# Patient Record
Sex: MALE | Race: BLACK OR AFRICAN AMERICAN | Employment: OTHER | URBAN - METROPOLITAN AREA
[De-identification: names, ages, dates, MRNs, and addresses within clinical notes are randomized per-mention and may not be internally consistent; named-entity substitution may affect disease eponyms.]

---

## 2017-03-20 ENCOUNTER — TELEPHONE (OUTPATIENT)
Dept: PHARMACY | Facility: CLINIC | Age: 47
End: 2017-03-20

## 2017-10-03 ENCOUNTER — CARE COORDINATION (OUTPATIENT)
Dept: CARE COORDINATION | Age: 47
End: 2017-10-03

## 2018-04-18 ENCOUNTER — CARE COORDINATION (OUTPATIENT)
Dept: CARE COORDINATION | Age: 48
End: 2018-04-18

## 2018-04-19 RX ORDER — ATORVASTATIN CALCIUM 10 MG/1
10 TABLET, FILM COATED ORAL DAILY
Qty: 30 TABLET | Refills: 3 | Status: SHIPPED | OUTPATIENT
Start: 2018-04-19 | End: 2018-07-09

## 2018-04-19 RX ORDER — FUROSEMIDE 40 MG/1
TABLET ORAL
Qty: 90 TABLET | Refills: 3 | Status: SHIPPED | OUTPATIENT
Start: 2018-04-19 | End: 2018-08-21 | Stop reason: SDUPTHER

## 2018-05-29 ENCOUNTER — OFFICE VISIT (OUTPATIENT)
Dept: INTERNAL MEDICINE CLINIC | Age: 48
End: 2018-05-29
Payer: MEDICARE

## 2018-05-29 VITALS
HEIGHT: 76 IN | BODY MASS INDEX: 38.36 KG/M2 | HEART RATE: 68 BPM | TEMPERATURE: 98 F | WEIGHT: 315 LBS | RESPIRATION RATE: 16 BRPM | DIASTOLIC BLOOD PRESSURE: 85 MMHG | SYSTOLIC BLOOD PRESSURE: 126 MMHG

## 2018-05-29 DIAGNOSIS — I10 ESSENTIAL HYPERTENSION: ICD-10-CM

## 2018-05-29 DIAGNOSIS — R53.82 CHRONIC FATIGUE: ICD-10-CM

## 2018-05-29 DIAGNOSIS — E78.5 HYPERLIPIDEMIA WITH TARGET LDL LESS THAN 100: ICD-10-CM

## 2018-05-29 DIAGNOSIS — N18.4 CKD (CHRONIC KIDNEY DISEASE) STAGE 4, GFR 15-29 ML/MIN (HCC): Primary | Chronic | ICD-10-CM

## 2018-05-29 PROCEDURE — 99212 OFFICE O/P EST SF 10 MIN: CPT | Performed by: INTERNAL MEDICINE

## 2018-05-29 PROCEDURE — G8417 CALC BMI ABV UP PARAM F/U: HCPCS | Performed by: INTERNAL MEDICINE

## 2018-05-29 PROCEDURE — G8427 DOCREV CUR MEDS BY ELIG CLIN: HCPCS | Performed by: INTERNAL MEDICINE

## 2018-05-29 PROCEDURE — 99213 OFFICE O/P EST LOW 20 MIN: CPT | Performed by: INTERNAL MEDICINE

## 2018-05-29 PROCEDURE — 1036F TOBACCO NON-USER: CPT | Performed by: INTERNAL MEDICINE

## 2018-06-13 RX ORDER — METOPROLOL SUCCINATE 100 MG/1
100 TABLET, EXTENDED RELEASE ORAL 2 TIMES DAILY
Qty: 60 TABLET | Refills: 5 | Status: SHIPPED | OUTPATIENT
Start: 2018-06-13 | End: 2018-07-09

## 2018-06-13 RX ORDER — CLONIDINE HYDROCHLORIDE 0.2 MG/1
0.2 TABLET ORAL 3 TIMES DAILY
Qty: 90 TABLET | Refills: 3 | Status: SHIPPED | OUTPATIENT
Start: 2018-06-13 | End: 2018-10-20 | Stop reason: SDUPTHER

## 2018-06-13 RX ORDER — ALLOPURINOL 100 MG/1
100 TABLET ORAL NIGHTLY
Qty: 30 TABLET | Refills: 5 | Status: SHIPPED | OUTPATIENT
Start: 2018-06-13 | End: 2018-12-17 | Stop reason: SDUPTHER

## 2018-06-28 ENCOUNTER — TELEPHONE (OUTPATIENT)
Dept: INTERNAL MEDICINE CLINIC | Age: 48
End: 2018-06-28

## 2018-07-09 ENCOUNTER — HOSPITAL ENCOUNTER (EMERGENCY)
Age: 48
Discharge: HOME | End: 2018-07-09
Payer: MEDICARE

## 2018-07-09 VITALS
HEART RATE: 65 BPM | DIASTOLIC BLOOD PRESSURE: 64 MMHG | OXYGEN SATURATION: 95 % | TEMPERATURE: 99.5 F | RESPIRATION RATE: 18 BRPM | SYSTOLIC BLOOD PRESSURE: 116 MMHG

## 2018-07-09 DIAGNOSIS — Z76.0 ENCOUNTER FOR MEDICATION REFILL: Primary | ICD-10-CM

## 2018-07-09 PROCEDURE — 99281 EMR DPT VST MAYX REQ PHY/QHP: CPT

## 2018-07-09 RX ORDER — METOPROLOL SUCCINATE 100 MG/1
100 TABLET, EXTENDED RELEASE ORAL DAILY
Qty: 30 TABLET | Refills: 0 | Status: ON HOLD | OUTPATIENT
Start: 2018-07-09 | End: 2018-11-13 | Stop reason: HOSPADM

## 2018-07-09 RX ORDER — PRAVASTATIN SODIUM 40 MG
40 TABLET ORAL DAILY
Qty: 30 TABLET | Refills: 0 | Status: SHIPPED | OUTPATIENT
Start: 2018-07-09 | End: 2019-02-12

## 2018-07-09 NOTE — ED PROVIDER NOTES
[facility]     Department of Emergency Medicine   ED  Provider Note  Admit Date/RoomTime: 7/9/2018  2:01 PM  ED Room: DEEPA/DEEPA  Chief Complaint   Medication Refill    History of Present Illness   Source of history provided by:  patient and spouse/SO. History/Exam Limitations: none. Slava Eddy is a 51 y.o. 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 Patient states that he was on disability and did not receive his disability check on the 3rd of this month when he normally does. He states that he was alerted at that time that he was taken off of his disability and therefore lost his Medicaid. Patient initially advises me that he is out of all of his medication for the past week. Upon further questioning and collaboration with his pharmacy, he has had most of his medications. Patient then admits that he was able to  some of them and has refills as well as prescription, he is just unable to afford them. Upon further review, patient could not afford his Lipitor, which she will be out of in 4 days' time. He will also be out of his metoprolol in a few days and he was unable to afford that as well. He has all his other medications and has been taking them as directed. He denies any chest pain, shortness of breath or leg swelling.  No other complaints. ROS   Pertinent positives and negatives are stated within HPI, all other systems reviewed and are negative. Past Surgical History:  has a past surgical history that includes Tonsillectomy and adenoidectomy; Carpal tunnel release; ECHO Compl W Dop Color Flow (4/21/2012 EF 60%); ECHO Compl W Dop Color Flow (9/7/2012); Cardiac pacemaker placement (4/27/12); AV fistula repair (Right, 6/28/13); Dialysis fistula creation (Right, 02/28/2104); and pacemaker placement. Social History:  reports that he has never smoked. He has never used smokeless tobacco. He reports that he does not drink alcohol or use drugs. Family History: family history includes Diabetes in his mother; Kidney Disease in his mother. Allergies: Patient has no known allergies. Physical Exam            ED Triage Vitals [07/09/18 1358]   BP Temp Temp Source Pulse Resp SpO2 Height Weight   116/64 99.5 °F (37.5 °C) Oral 65 18 95 % -- --      Oxygen Saturation Interpretation: Normal.    Constitutional:  Alert, development consistent with age. HEENT:  NC/NT. Airway patent. Neck:  Normal ROM. Supple. Respiratory:  Clear to auscultation and breath sounds equal.    CV: Regular rate and rhythm, normal heart sounds, without pathological murmurs, ectopy, gallops, or rubs. GI:  Abdomen Soft, nontender, good bowel sounds. No firm or pulsatile mass. Integument:  No rashes, erythema present. Lymphatics: No lymphangitis or adenopathy noted. Neurological:  Oriented. Motor functions intact. Lab / Imaging Results   (All laboratory and radiology results have been personally reviewed by myself)  Labs:  No results found for this visit on 07/09/18. Imaging: All Radiology results interpreted by Radiologist unless otherwise noted.   No orders to display       ED Course / Medical Decision Making   Medications - No data to display     Consults:   None    , The emergency room pharmacist spoke with his pharmacy and we were able to establish that

## 2018-07-16 ENCOUNTER — CARE COORDINATION (OUTPATIENT)
Dept: CARE COORDINATION | Age: 48
End: 2018-07-16

## 2018-07-17 NOTE — CARE COORDINATION
LATE ENTRY:  7/16/2018 @ 1:37    Patient called for 7/9/2018 ED visit f/u  - Message left on patients phone asking how he is doing, if he got his discharge instructions, if he has made his ED f/u appointment, if he has any needs or questions and asked that he call back at their earliest convenience with contact information given.

## 2018-07-31 ENCOUNTER — CARE COORDINATION (OUTPATIENT)
Dept: CARE COORDINATION | Age: 48
End: 2018-07-31

## 2018-08-15 ENCOUNTER — HOSPITAL ENCOUNTER (OUTPATIENT)
Dept: CT IMAGING | Age: 48
Discharge: HOME | End: 2018-08-15
Payer: MEDICARE

## 2018-08-15 ENCOUNTER — HOSPITAL ENCOUNTER (OUTPATIENT)
Age: 48
Discharge: HOME | End: 2018-08-15
Payer: MEDICARE

## 2018-08-15 DIAGNOSIS — R22.1 NECK MASS: ICD-10-CM

## 2018-08-15 LAB
T4 FREE: 1.14 NG/DL (ref 0.93–1.7)
T4 TOTAL: 6.4 MCG/DL (ref 4.5–11.7)
TSH SERPL DL<=0.05 MIU/L-ACNC: 3.32 UIU/ML (ref 0.27–4.2)

## 2018-08-15 PROCEDURE — 70490 CT SOFT TISSUE NECK W/O DYE: CPT

## 2018-08-15 PROCEDURE — 84439 ASSAY OF FREE THYROXINE: CPT

## 2018-08-15 PROCEDURE — 36415 COLL VENOUS BLD VENIPUNCTURE: CPT

## 2018-08-15 PROCEDURE — 84443 ASSAY THYROID STIM HORMONE: CPT

## 2018-08-21 RX ORDER — FUROSEMIDE 40 MG/1
TABLET ORAL
Qty: 90 TABLET | Refills: 3 | Status: SHIPPED | OUTPATIENT
Start: 2018-08-21 | End: 2019-02-04 | Stop reason: SDUPTHER

## 2018-09-07 ENCOUNTER — CARE COORDINATION (OUTPATIENT)
Dept: CARE COORDINATION | Age: 48
End: 2018-09-07

## 2018-09-19 ENCOUNTER — HOSPITAL ENCOUNTER (OUTPATIENT)
Age: 48
Discharge: HOME | End: 2018-09-19
Payer: MEDICARE

## 2018-09-19 LAB
ALBUMIN SERPL-MCNC: 3.7 G/DL (ref 3.5–5.2)
ALP BLD-CCNC: 72 U/L (ref 40–129)
ALT SERPL-CCNC: 6 U/L (ref 0–40)
ANION GAP SERPL CALCULATED.3IONS-SCNC: 13 MMOL/L (ref 7–16)
AST SERPL-CCNC: 14 U/L (ref 0–39)
BACTERIA: NORMAL /HPF
BASOPHILS ABSOLUTE: 0.05 E9/L (ref 0–0.2)
BASOPHILS RELATIVE PERCENT: 0.9 % (ref 0–2)
BILIRUB SERPL-MCNC: 3 MG/DL (ref 0–1.2)
BUN BLDV-MCNC: 36 MG/DL (ref 6–20)
CALCIUM SERPL-MCNC: 9.3 MG/DL (ref 8.6–10.2)
CHLORIDE BLD-SCNC: 101 MMOL/L (ref 98–107)
CO2: 30 MMOL/L (ref 22–29)
CREAT SERPL-MCNC: 3.5 MG/DL (ref 0.7–1.2)
CREATININE URINE: 93 MG/DL (ref 40–278)
EOSINOPHILS ABSOLUTE: 0.16 E9/L (ref 0.05–0.5)
EOSINOPHILS RELATIVE PERCENT: 2.8 % (ref 0–6)
GFR AFRICAN AMERICAN: 23
GFR NON-AFRICAN AMERICAN: 19 ML/MIN/1.73
GLUCOSE BLD-MCNC: 128 MG/DL (ref 74–109)
HCT VFR BLD CALC: 38.4 % (ref 37–54)
HEMOGLOBIN: 11.3 G/DL (ref 12.5–16.5)
IMMATURE GRANULOCYTES #: 0.02 E9/L
IMMATURE GRANULOCYTES %: 0.3 % (ref 0–5)
LYMPHOCYTES ABSOLUTE: 1.51 E9/L (ref 1.5–4)
LYMPHOCYTES RELATIVE PERCENT: 26.4 % (ref 20–42)
MCH RBC QN AUTO: 28.3 PG (ref 26–35)
MCHC RBC AUTO-ENTMCNC: 29.4 % (ref 32–34.5)
MCV RBC AUTO: 96.2 FL (ref 80–99.9)
MONOCYTES ABSOLUTE: 0.54 E9/L (ref 0.1–0.95)
MONOCYTES RELATIVE PERCENT: 9.4 % (ref 2–12)
NEUTROPHILS ABSOLUTE: 3.45 E9/L (ref 1.8–7.3)
NEUTROPHILS RELATIVE PERCENT: 60.2 % (ref 43–80)
PARATHYROID HORMONE INTACT: 194 PG/ML (ref 15–65)
PDW BLD-RTO: 16.8 FL (ref 11.5–15)
PHOSPHORUS: 3.5 MG/DL (ref 2.5–4.5)
PLATELET # BLD: 278 E9/L (ref 130–450)
PMV BLD AUTO: 10 FL (ref 7–12)
POTASSIUM SERPL-SCNC: 4.2 MMOL/L (ref 3.5–5)
PROTEIN PROTEIN: 294 MG/DL (ref 0–12)
PROTEIN/CREAT RATIO: 3.2
PROTEIN/CREAT RATIO: 3.2 (ref 0–0.2)
RBC # BLD: 3.99 E12/L (ref 3.8–5.8)
RBC UA: NORMAL /HPF (ref 0–2)
SODIUM BLD-SCNC: 144 MMOL/L (ref 132–146)
TOTAL PROTEIN: 7.9 G/DL (ref 6.4–8.3)
VITAMIN D 25-HYDROXY: 8 NG/ML (ref 30–100)
WBC # BLD: 5.7 E9/L (ref 4.5–11.5)
WBC UA: NORMAL /HPF (ref 0–5)

## 2018-09-19 PROCEDURE — 82306 VITAMIN D 25 HYDROXY: CPT

## 2018-09-19 PROCEDURE — 84156 ASSAY OF PROTEIN URINE: CPT

## 2018-09-19 PROCEDURE — 83970 ASSAY OF PARATHORMONE: CPT

## 2018-09-19 PROCEDURE — 36415 COLL VENOUS BLD VENIPUNCTURE: CPT

## 2018-09-19 PROCEDURE — 80053 COMPREHEN METABOLIC PANEL: CPT

## 2018-09-19 PROCEDURE — 84100 ASSAY OF PHOSPHORUS: CPT

## 2018-09-19 PROCEDURE — 85025 COMPLETE CBC W/AUTO DIFF WBC: CPT

## 2018-09-19 PROCEDURE — 81015 MICROSCOPIC EXAM OF URINE: CPT

## 2018-09-19 PROCEDURE — 82044 UR ALBUMIN SEMIQUANTITATIVE: CPT

## 2018-09-19 PROCEDURE — 82570 ASSAY OF URINE CREATININE: CPT

## 2018-09-23 LAB
MICROALBUMIN UR-MCNC: 2056.7 MG/L
MICROALBUMIN/CREAT UR-RTO: 2211.5 (ref 0–30)

## 2018-10-22 RX ORDER — CLONIDINE HYDROCHLORIDE 0.2 MG/1
TABLET ORAL
Qty: 90 TABLET | Refills: 3 | Status: SHIPPED | OUTPATIENT
Start: 2018-10-22 | End: 2018-12-19 | Stop reason: SDUPTHER

## 2018-11-02 ENCOUNTER — CARE COORDINATION (OUTPATIENT)
Dept: CARE COORDINATION | Age: 48
End: 2018-11-02

## 2018-11-02 NOTE — CARE COORDINATION
Patient called for HTN, CHF and need for appointment  - message left on both numbers asking how patient is doing and that we have missed him at the office for f/u  - asked that he call the office to make an appointment to call [facility] for follow up. Contact information given.

## 2018-11-10 ENCOUNTER — HOSPITAL ENCOUNTER (OUTPATIENT)
Dept: ULTRASOUND IMAGING | Age: 48
Discharge: HOME | DRG: 299 | End: 2018-11-10
Payer: MEDICARE

## 2018-11-10 ENCOUNTER — APPOINTMENT (OUTPATIENT)
Dept: ULTRASOUND IMAGING | Age: 48
DRG: 299 | End: 2018-11-10
Payer: MEDICARE

## 2018-11-10 ENCOUNTER — APPOINTMENT (OUTPATIENT)
Dept: GENERAL RADIOLOGY | Age: 48
DRG: 299 | End: 2018-11-10
Payer: MEDICARE

## 2018-11-10 ENCOUNTER — HOSPITAL ENCOUNTER (INPATIENT)
Age: 48
LOS: 3 days | Discharge: ANOTHER HOSPITAL | DRG: 299 | End: 2018-11-13
Attending: EMERGENCY MEDICINE | Admitting: INTERNAL MEDICINE
Payer: MEDICARE

## 2018-11-10 DIAGNOSIS — R22.1 NECK MASS: ICD-10-CM

## 2018-11-10 DIAGNOSIS — R06.00 DYSPNEA AND RESPIRATORY ABNORMALITIES: ICD-10-CM

## 2018-11-10 DIAGNOSIS — N28.9 RENAL INSUFFICIENCY: ICD-10-CM

## 2018-11-10 DIAGNOSIS — R60.0 FACIAL EDEMA: Primary | ICD-10-CM

## 2018-11-10 DIAGNOSIS — R06.89 DYSPNEA AND RESPIRATORY ABNORMALITIES: ICD-10-CM

## 2018-11-10 PROBLEM — I50.33 ACUTE ON CHRONIC DIASTOLIC (CONGESTIVE) HEART FAILURE (HCC): Status: ACTIVE | Noted: 2018-11-10

## 2018-11-10 LAB
ALBUMIN SERPL-MCNC: 3.7 G/DL (ref 3.5–5.2)
ALP BLD-CCNC: 72 U/L (ref 40–129)
ALT SERPL-CCNC: 7 U/L (ref 0–40)
ANION GAP SERPL CALCULATED.3IONS-SCNC: 11 MMOL/L (ref 7–16)
AST SERPL-CCNC: 13 U/L (ref 0–39)
BACTERIA: NORMAL /HPF
BASOPHILS ABSOLUTE: 0.04 E9/L (ref 0–0.2)
BASOPHILS RELATIVE PERCENT: 0.8 % (ref 0–2)
BILIRUB SERPL-MCNC: 3 MG/DL (ref 0–1.2)
BILIRUBIN URINE: NEGATIVE
BLOOD, URINE: ABNORMAL
BUN BLDV-MCNC: 38 MG/DL (ref 6–20)
CALCIUM SERPL-MCNC: 9 MG/DL (ref 8.6–10.2)
CHLORIDE BLD-SCNC: 103 MMOL/L (ref 98–107)
CLARITY: CLEAR
CO2: 29 MMOL/L (ref 22–29)
COLOR: YELLOW
CREAT SERPL-MCNC: 4.1 MG/DL (ref 0.7–1.2)
EKG ATRIAL RATE: 267 BPM
EKG Q-T INTERVAL: 452 MS
EKG QRS DURATION: 126 MS
EKG QTC CALCULATION (BAZETT): 412 MS
EKG R AXIS: -37 DEGREES
EKG T AXIS: 169 DEGREES
EKG VENTRICULAR RATE: 50 BPM
EOSINOPHILS ABSOLUTE: 0.21 E9/L (ref 0.05–0.5)
EOSINOPHILS RELATIVE PERCENT: 4.3 % (ref 0–6)
EPITHELIAL CELLS, UA: NORMAL /HPF
GFR AFRICAN AMERICAN: 19
GFR NON-AFRICAN AMERICAN: 16 ML/MIN/1.73
GLUCOSE BLD-MCNC: 136 MG/DL (ref 74–99)
GLUCOSE URINE: NEGATIVE MG/DL
HCT VFR BLD CALC: 36.5 % (ref 37–54)
HEMOGLOBIN: 10.6 G/DL (ref 12.5–16.5)
IMMATURE GRANULOCYTES #: 0.01 E9/L
IMMATURE GRANULOCYTES %: 0.2 % (ref 0–5)
KETONES, URINE: NEGATIVE MG/DL
LEUKOCYTE ESTERASE, URINE: NEGATIVE
LV EF: 43 %
LVEF MODALITY: NORMAL
LYMPHOCYTES ABSOLUTE: 1.09 E9/L (ref 1.5–4)
LYMPHOCYTES RELATIVE PERCENT: 22.4 % (ref 20–42)
MCH RBC QN AUTO: 28.5 PG (ref 26–35)
MCHC RBC AUTO-ENTMCNC: 29 % (ref 32–34.5)
MCV RBC AUTO: 98.1 FL (ref 80–99.9)
MONOCYTES ABSOLUTE: 0.6 E9/L (ref 0.1–0.95)
MONOCYTES RELATIVE PERCENT: 12.3 % (ref 2–12)
NEUTROPHILS ABSOLUTE: 2.92 E9/L (ref 1.8–7.3)
NEUTROPHILS RELATIVE PERCENT: 60 % (ref 43–80)
NITRITE, URINE: NEGATIVE
PDW BLD-RTO: 18 FL (ref 11.5–15)
PH UA: 6 (ref 5–9)
PLATELET # BLD: 269 E9/L (ref 130–450)
PMV BLD AUTO: 10.5 FL (ref 7–12)
POTASSIUM SERPL-SCNC: 4.1 MMOL/L (ref 3.5–5)
PRO-BNP: ABNORMAL PG/ML (ref 0–125)
PROTEIN UA: 100 MG/DL
RBC # BLD: 3.72 E12/L (ref 3.8–5.8)
RBC UA: NORMAL /HPF (ref 0–2)
SODIUM BLD-SCNC: 143 MMOL/L (ref 132–146)
SPECIFIC GRAVITY UA: 1.02 (ref 1–1.03)
TOTAL PROTEIN: 7.6 G/DL (ref 6.4–8.3)
TROPONIN: 0.05 NG/ML (ref 0–0.03)
TROPONIN: 0.06 NG/ML (ref 0–0.03)
UROBILINOGEN, URINE: 4 E.U./DL
WBC # BLD: 4.9 E9/L (ref 4.5–11.5)
WBC UA: NORMAL /HPF (ref 0–5)

## 2018-11-10 PROCEDURE — 84484 ASSAY OF TROPONIN QUANT: CPT

## 2018-11-10 PROCEDURE — 93970 EXTREMITY STUDY: CPT

## 2018-11-10 PROCEDURE — 83880 ASSAY OF NATRIURETIC PEPTIDE: CPT

## 2018-11-10 PROCEDURE — 2580000003 HC RX 258: Performed by: INTERNAL MEDICINE

## 2018-11-10 PROCEDURE — 6360000002 HC RX W HCPCS: Performed by: INTERNAL MEDICINE

## 2018-11-10 PROCEDURE — 80053 COMPREHEN METABOLIC PANEL: CPT

## 2018-11-10 PROCEDURE — 94640 AIRWAY INHALATION TREATMENT: CPT

## 2018-11-10 PROCEDURE — 76536 US EXAM OF HEAD AND NECK: CPT

## 2018-11-10 PROCEDURE — 2060000000 HC ICU INTERMEDIATE R&B

## 2018-11-10 PROCEDURE — 81001 URINALYSIS AUTO W/SCOPE: CPT

## 2018-11-10 PROCEDURE — 85025 COMPLETE CBC W/AUTO DIFF WBC: CPT

## 2018-11-10 PROCEDURE — 93005 ELECTROCARDIOGRAM TRACING: CPT | Performed by: PHYSICIAN ASSISTANT

## 2018-11-10 PROCEDURE — 93306 TTE W/DOPPLER COMPLETE: CPT

## 2018-11-10 PROCEDURE — 6370000000 HC RX 637 (ALT 250 FOR IP): Performed by: INTERNAL MEDICINE

## 2018-11-10 PROCEDURE — 99285 EMERGENCY DEPT VISIT HI MDM: CPT

## 2018-11-10 PROCEDURE — 36415 COLL VENOUS BLD VENIPUNCTURE: CPT

## 2018-11-10 PROCEDURE — 71045 X-RAY EXAM CHEST 1 VIEW: CPT

## 2018-11-10 RX ORDER — SODIUM CHLORIDE 0.9 % (FLUSH) 0.9 %
10 SYRINGE (ML) INJECTION PRN
Status: DISCONTINUED | OUTPATIENT
Start: 2018-11-10 | End: 2018-11-13 | Stop reason: HOSPADM

## 2018-11-10 RX ORDER — IPRATROPIUM BROMIDE AND ALBUTEROL SULFATE 2.5; .5 MG/3ML; MG/3ML
1 SOLUTION RESPIRATORY (INHALATION)
Status: DISCONTINUED | OUTPATIENT
Start: 2018-11-10 | End: 2018-11-13 | Stop reason: HOSPADM

## 2018-11-10 RX ORDER — ONDANSETRON 2 MG/ML
4 INJECTION INTRAMUSCULAR; INTRAVENOUS EVERY 6 HOURS PRN
Status: DISCONTINUED | OUTPATIENT
Start: 2018-11-10 | End: 2018-11-13 | Stop reason: HOSPADM

## 2018-11-10 RX ORDER — PRAVASTATIN SODIUM 20 MG
40 TABLET ORAL DAILY
Status: DISCONTINUED | OUTPATIENT
Start: 2018-11-10 | End: 2018-11-13 | Stop reason: HOSPADM

## 2018-11-10 RX ORDER — ASCORBIC ACID 500 MG
500 TABLET ORAL DAILY
Status: DISCONTINUED | OUTPATIENT
Start: 2018-11-10 | End: 2018-11-13 | Stop reason: HOSPADM

## 2018-11-10 RX ORDER — LOSARTAN POTASSIUM 50 MG/1
50 TABLET ORAL DAILY
Status: DISCONTINUED | OUTPATIENT
Start: 2018-11-10 | End: 2018-11-13 | Stop reason: HOSPADM

## 2018-11-10 RX ORDER — METOPROLOL SUCCINATE 100 MG/1
100 TABLET, EXTENDED RELEASE ORAL 2 TIMES DAILY
Status: DISCONTINUED | OUTPATIENT
Start: 2018-11-10 | End: 2018-11-13 | Stop reason: HOSPADM

## 2018-11-10 RX ORDER — 0.9 % SODIUM CHLORIDE 0.9 %
250 INTRAVENOUS SOLUTION INTRAVENOUS ONCE
Status: DISCONTINUED | OUTPATIENT
Start: 2018-11-10 | End: 2018-11-12

## 2018-11-10 RX ORDER — SODIUM CHLORIDE 0.9 % (FLUSH) 0.9 %
10 SYRINGE (ML) INJECTION EVERY 12 HOURS SCHEDULED
Status: DISCONTINUED | OUTPATIENT
Start: 2018-11-10 | End: 2018-11-13 | Stop reason: HOSPADM

## 2018-11-10 RX ORDER — METOPROLOL SUCCINATE 100 MG/1
100 TABLET, EXTENDED RELEASE ORAL 2 TIMES DAILY
Status: DISCONTINUED | OUTPATIENT
Start: 2018-11-10 | End: 2018-11-10 | Stop reason: CLARIF

## 2018-11-10 RX ORDER — ALLOPURINOL 100 MG/1
100 TABLET ORAL NIGHTLY
Status: DISCONTINUED | OUTPATIENT
Start: 2018-11-10 | End: 2018-11-13 | Stop reason: HOSPADM

## 2018-11-10 RX ORDER — CLONIDINE HYDROCHLORIDE 0.2 MG/1
0.2 TABLET ORAL 3 TIMES DAILY
Status: DISCONTINUED | OUTPATIENT
Start: 2018-11-10 | End: 2018-11-13 | Stop reason: HOSPADM

## 2018-11-10 RX ORDER — ACETAMINOPHEN 325 MG/1
650 TABLET ORAL EVERY 4 HOURS PRN
Status: DISCONTINUED | OUTPATIENT
Start: 2018-11-10 | End: 2018-11-13 | Stop reason: HOSPADM

## 2018-11-10 RX ORDER — LANOLIN ALCOHOL/MO/W.PET/CERES
3 CREAM (GRAM) TOPICAL NIGHTLY PRN
Status: DISCONTINUED | OUTPATIENT
Start: 2018-11-10 | End: 2018-11-13 | Stop reason: HOSPADM

## 2018-11-10 RX ORDER — HEPARIN SODIUM 5000 [USP'U]/ML
5000 INJECTION, SOLUTION INTRAVENOUS; SUBCUTANEOUS EVERY 8 HOURS SCHEDULED
Status: DISCONTINUED | OUTPATIENT
Start: 2018-11-10 | End: 2018-11-13

## 2018-11-10 RX ORDER — ASPIRIN 81 MG/1
81 TABLET ORAL DAILY
Status: DISCONTINUED | OUTPATIENT
Start: 2018-11-10 | End: 2018-11-13 | Stop reason: HOSPADM

## 2018-11-10 RX ADMIN — IPRATROPIUM BROMIDE AND ALBUTEROL SULFATE 1 AMPULE: .5; 3 SOLUTION RESPIRATORY (INHALATION) at 19:49

## 2018-11-10 RX ADMIN — ALLOPURINOL 100 MG: 100 TABLET ORAL at 21:02

## 2018-11-10 RX ADMIN — Medication 10 ML: at 21:03

## 2018-11-10 RX ADMIN — CLONIDINE HYDROCHLORIDE 0.2 MG: 0.2 TABLET ORAL at 21:02

## 2018-11-10 RX ADMIN — FUROSEMIDE 40 MG: 10 INJECTION, SOLUTION INTRAMUSCULAR; INTRAVENOUS at 21:03

## 2018-11-10 RX ADMIN — CLONIDINE HYDROCHLORIDE 0.2 MG: 0.2 TABLET ORAL at 16:00

## 2018-11-10 RX ADMIN — FUROSEMIDE 40 MG: 10 INJECTION, SOLUTION INTRAMUSCULAR; INTRAVENOUS at 16:00

## 2018-11-10 RX ADMIN — METOPROLOL SUCCINATE 100 MG: 100 TABLET, EXTENDED RELEASE ORAL at 21:02

## 2018-11-10 ASSESSMENT — PAIN SCALES - GENERAL
PAINLEVEL_OUTOF10: 9
PAINLEVEL_OUTOF10: 5

## 2018-11-10 ASSESSMENT — PAIN DESCRIPTION - PAIN TYPE
TYPE: ACUTE PAIN
TYPE: ACUTE PAIN

## 2018-11-10 ASSESSMENT — PAIN DESCRIPTION - ORIENTATION
ORIENTATION: RIGHT
ORIENTATION: RIGHT

## 2018-11-10 ASSESSMENT — PAIN DESCRIPTION - LOCATION
LOCATION: FOOT
LOCATION: FOOT

## 2018-11-10 ASSESSMENT — PAIN DESCRIPTION - DESCRIPTORS
DESCRIPTORS: ACHING
DESCRIPTORS: ACHING

## 2018-11-10 NOTE — ED PROVIDER NOTES
ED Attending  CC: No      HPI:  11/10/18,   Time: 10:55 AM         Beth Anton is a 51 y.o. male presenting to the ED for facial edema and SOB, beginning 3 weeks ago. The complaint has been intermittent, moderate in severity, and worsened by nothing. Pt presents from [address] [place] [address] today. Wife states he has been climbing over the last few weeks. She reports intermittent swelling in his face and particularly around his eyes. He's been increasingly weak and short of breath. Wife states that he goes to [employer] but spends most of the rest of the week in bed. He has a known history of chronic kidney disease stage IV, diastolic congestive heart failure, atrial fibrillation, hypertension, and gout. The patient has a right AV fistula though it is never been used. In 2017 he was seen by Dr. 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 There was a concern for venous outflow tract obstruction. 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 He had balloon angioplasty of both areas. The patient notes that his current symptoms are very similar to this episode in 2017. He has been undergoing workup recently with ENT for some swollen lymph nodes in his neck. Pt denies CP or peripheral edema. States he is SOB with exertion and has heard himself wheezing intermittently. Denies fever or chills.           ROS:     Constitutional: Negative for fever and chills  HENT:See HPI  Eyes: See HPI  Respiratory: See HPI  Cardiovascular: See HPI  Gastrointestinal: Negative for nausea, vomiting, diarrhea and abdominal distention  Genitourinary: Negative for dysuria and frequency  Musculoskeletal: Negative for back pain and arthralgia  Skin: Negative for rash and wound  Neurological: Negative for weakness and headaches  Hematological: Negative for adenopathy    All other systems reviewed and are negative      -------------------------------- PAST HISTORY ----------------------------------  Past Medical History:  has a past medical history of Arthritis; Blood circulation, collateral; Cardiomegaly; CKD (chronic kidney disease) stage 4, GFR 15-29 ml/min ([facility] Depression; Diastolic CHF ([facility] History of echocardiogram; History of echocardiogram; Hyperlipidemia; Hypertension; Hypertension; Knee dislocation; 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 Past Surgical History:  has a past surgical history that includes Tonsillectomy and adenoidectomy; Carpal tunnel release; ECHO Compl W Dop Color Flow (4/21/2012 EF 60%); ECHO Compl W Dop Color Flow (9/7/2012); Cardiac pacemaker placement (4/27/12); AV fistula repair (Right, 6/28/13); Dialysis fistula creation (Right, 02/28/2104); and pacemaker placement. Social History:  reports that he has never smoked. He has never used smokeless tobacco. He reports that he does not drink alcohol or use drugs. Family History: family history includes Diabetes in his mother; Kidney Disease in his mother. The patients home medications have been reviewed. Allergies: Patient has no known allergies.     --------------------------------- RESULTS ------------------------------------------  All laboratory and radiology results have been personally reviewed by myself   LABS:  Results for orders placed or performed during the hospital encounter of 11/10/18   CBC Auto Differential   Result Value Ref Range    WBC 4.9 4.5 - 11.5 E9/L    RBC 3.72 (L) 3.80 - 5.80 E12/L    Hemoglobin 10.6 (L) 12.5 - 16.5 g/dL    Hematocrit 36.5 (L) 37.0 - 54.0 %    MCV 98.1 80.0 - 99.9 fL    MCH 28.5 26.0 - 35.0 pg    MCHC 29.0 (L) 32.0 - 34.5 %    RDW 18.0 (H) 11.5 - 15.0 fL    Platelets 891 862 - 926 E9/L    MPV 10.5 7.0 - 12.0 fL    Neutrophils % 60.0 43.0 - 80.0 %    Immature Granulocytes % 0.2 0.0 - 5.0 %    Lymphocytes % 22.4 20.0 - 42.0 %    Monocytes % 12.3 (H) 2.0 - 12.0 %    Eosinophils % 4.3 0.0 - 6.0 %

## 2018-11-10 NOTE — H&P
Right 02/28/2104    ECHO COMPL W DOP COLOR FLOW  4/21/2012 EF 60%         ECHO COMPL W DOP COLOR FLOW  9/7/2012         PACEMAKER PLACEMENT      TONSILLECTOMY AND ADENOIDECTOMY       Family History   Problem Relation Age of Onset    Diabetes Mother     Kidney Disease Mother      Social History     Social History    Marital status:      Spouse name: N/A    Number of children: N/A    Years of education: N/A     Occupational History    unemployed      Social History Main Topics    Smoking status: Never Smoker    Smokeless tobacco: Never Used    Alcohol use No    Drug use: No      Comment: quit July of 2016    Sexual activity: Yes     Partners: Female     Other Topics Concern    Not on file     Social History Narrative    No narrative on file     Prior to Admission medications    Medication Sig Start Date End Date Taking?  Authorizing Provider   cloNIDine (CATAPRES) 0.2 MG tablet TAKE 1 TABLET BY MOUTH THREE TIMES DAILY 10/22/18  Yes Alie Johnson DO   furosemide (LASIX) 40 MG tablet TAKE ONE TABLET BY MOUTH THREE TIMES DAILY 8/21/18  Yes Wandy March DO   metoprolol succinate (TOPROL XL) 100 MG extended release tablet Take 1 tablet by mouth daily  Patient taking differently: Take 100 mg by mouth 2 times daily  7/9/18 11/10/18 Yes JAXSON Powers   pravastatin (PRAVACHOL) 40 MG tablet Take 1 tablet by mouth daily 7/9/18 11/10/18 Yes JAXSON Powers   allopurinol (ZYLOPRIM) 100 MG tablet Take 1 tablet by mouth nightly 6/13/18  Yes Ama March DO   losartan (COZAAR) 50 MG tablet Take 50 mg by mouth daily   Yes Historical Provider, MD   melatonin 3 MG TABS tablet Take 1 tablet by mouth daily 12/19/17  Yes Victor M Pals, DO   Cholecalciferol (VITAMIN D3) 5000 UNITS CAPS Take 1 capsule by mouth daily 4/21/17  Yes Antiem T Alford, DO   Ascorbic Acid (VITAMIN C) 500 MG tablet Take 500 mg by mouth daily   Yes Historical Provider, MD   aspirin (ASPIRIN LOW DOSE) 81 MG EC tablet are grossly intact, the patient moves all extremities spontaneously, and no focal deficits are appreciated on exam    Recent vitals, labs, and imaging results have been reviewed and are available in the electronic medical record. Assessment and Plan  Patient is a 51 y.o. male who presented with facial swelling and shortness of breath. The active problem list is as follows:    Acute exacerbation of diastolic CHF  CKD, stage IV  Adenopathy   Left neck cystic lesion seen on ultrasound  Pacemaker in situ  Nonischemic cardiomyopathy  Paroxysmal atrial fibrillation, on 81mg aspirin  Hypertension  Hyperlipidemia  Morbid obesity secondary to increased calorie intake    Patient will be admitted to [address] [place] [address]. Cardiac enzymes will be trended, and echocardiogram has been ordered. IV diuretics will be given. Nephrology and cardiology have been consulted. ENT has also been consulted regarding neck ultrasound findings. Unfortunately, contrasted images to further evaluate vasculature are unable to be obtained at this time due to patient's renal function. Patient follows with Dr. Elmo oFster, who does not come to this hospital. Additional vascular consultation or transfer to another facility will be arranged if indicated after further workup. · DVT prophylaxis with heparin.   · The case and plan of care were discussed with Dr. Heidy Fish DO PGY2  11/10/2018  1:10 PM

## 2018-11-10 NOTE — LETTER
[id number]  [facility] [address]  Phone: [phone]             November 13, 2018    Patient: Shu Neal   YOB: 1970   Date of Visit: 11/10/2018       To Whom It May Concern:    Jan Almanza was seen and treated in our facility  beginning 11/10/2018 until 11/13/2018.  He was transferred to [facility] on the 13th      Sincerely,       Elisha Styles RN         Signature:__________________________________

## 2018-11-11 ENCOUNTER — APPOINTMENT (OUTPATIENT)
Dept: ULTRASOUND IMAGING | Age: 48
DRG: 299 | End: 2018-11-11
Payer: MEDICARE

## 2018-11-11 LAB
ALBUMIN SERPL-MCNC: 3.6 G/DL (ref 3.5–5.2)
ALP BLD-CCNC: 63 U/L (ref 40–129)
ALT SERPL-CCNC: 8 U/L (ref 0–40)
ANION GAP SERPL CALCULATED.3IONS-SCNC: 11 MMOL/L (ref 7–16)
AST SERPL-CCNC: 29 U/L (ref 0–39)
BILIRUB SERPL-MCNC: 3 MG/DL (ref 0–1.2)
BUN BLDV-MCNC: 38 MG/DL (ref 6–20)
CALCIUM SERPL-MCNC: 9 MG/DL (ref 8.6–10.2)
CHLORIDE BLD-SCNC: 102 MMOL/L (ref 98–107)
CHOLESTEROL, TOTAL: 116 MG/DL (ref 0–199)
CO2: 30 MMOL/L (ref 22–29)
CREAT SERPL-MCNC: 4.1 MG/DL (ref 0.7–1.2)
GFR AFRICAN AMERICAN: 19
GFR NON-AFRICAN AMERICAN: 16 ML/MIN/1.73
GLUCOSE BLD-MCNC: 112 MG/DL (ref 74–99)
HBA1C MFR BLD: 5.4 % (ref 4–5.6)
HCT VFR BLD CALC: 35.5 % (ref 37–54)
HDLC SERPL-MCNC: 20 MG/DL
HEMOGLOBIN: 10.3 G/DL (ref 12.5–16.5)
LDL CHOLESTEROL CALCULATED: 66 MG/DL (ref 0–99)
MAGNESIUM: 2.1 MG/DL (ref 1.6–2.6)
MCH RBC QN AUTO: 28.8 PG (ref 26–35)
MCHC RBC AUTO-ENTMCNC: 29 % (ref 32–34.5)
MCV RBC AUTO: 99.2 FL (ref 80–99.9)
PDW BLD-RTO: 18.2 FL (ref 11.5–15)
PHOSPHORUS: 4.1 MG/DL (ref 2.5–4.5)
PLATELET # BLD: 238 E9/L (ref 130–450)
PMV BLD AUTO: 9.4 FL (ref 7–12)
POTASSIUM SERPL-SCNC: 5 MMOL/L (ref 3.5–5)
RBC # BLD: 3.58 E12/L (ref 3.8–5.8)
SODIUM BLD-SCNC: 143 MMOL/L (ref 132–146)
T4 FREE: 1.2 NG/DL (ref 0.93–1.7)
TOTAL PROTEIN: 7.6 G/DL (ref 6.4–8.3)
TRIGL SERPL-MCNC: 151 MG/DL (ref 0–149)
TROPONIN: 0.05 NG/ML (ref 0–0.03)
TSH SERPL DL<=0.05 MIU/L-ACNC: 3.04 UIU/ML (ref 0.27–4.2)
VLDLC SERPL CALC-MCNC: 30 MG/DL
WBC # BLD: 4.8 E9/L (ref 4.5–11.5)

## 2018-11-11 PROCEDURE — 84443 ASSAY THYROID STIM HORMONE: CPT

## 2018-11-11 PROCEDURE — 84439 ASSAY OF FREE THYROXINE: CPT

## 2018-11-11 PROCEDURE — 83036 HEMOGLOBIN GLYCOSYLATED A1C: CPT

## 2018-11-11 PROCEDURE — 2580000003 HC RX 258: Performed by: INTERNAL MEDICINE

## 2018-11-11 PROCEDURE — 6370000000 HC RX 637 (ALT 250 FOR IP): Performed by: INTERNAL MEDICINE

## 2018-11-11 PROCEDURE — 84100 ASSAY OF PHOSPHORUS: CPT

## 2018-11-11 PROCEDURE — 80061 LIPID PANEL: CPT

## 2018-11-11 PROCEDURE — 93971 EXTREMITY STUDY: CPT

## 2018-11-11 PROCEDURE — 36415 COLL VENOUS BLD VENIPUNCTURE: CPT

## 2018-11-11 PROCEDURE — 94640 AIRWAY INHALATION TREATMENT: CPT

## 2018-11-11 PROCEDURE — 83735 ASSAY OF MAGNESIUM: CPT

## 2018-11-11 PROCEDURE — 80053 COMPREHEN METABOLIC PANEL: CPT

## 2018-11-11 PROCEDURE — 84484 ASSAY OF TROPONIN QUANT: CPT

## 2018-11-11 PROCEDURE — 2060000000 HC ICU INTERMEDIATE R&B

## 2018-11-11 PROCEDURE — 6360000002 HC RX W HCPCS: Performed by: INTERNAL MEDICINE

## 2018-11-11 PROCEDURE — 85027 COMPLETE CBC AUTOMATED: CPT

## 2018-11-11 RX ORDER — METOLAZONE 2.5 MG/1
5 TABLET ORAL
Status: DISCONTINUED | OUTPATIENT
Start: 2018-11-12 | End: 2018-11-13 | Stop reason: HOSPADM

## 2018-11-11 RX ADMIN — METOPROLOL SUCCINATE 100 MG: 100 TABLET, EXTENDED RELEASE ORAL at 21:26

## 2018-11-11 RX ADMIN — LOSARTAN POTASSIUM 50 MG: 50 TABLET ORAL at 08:30

## 2018-11-11 RX ADMIN — ASPIRIN 81 MG: 81 TABLET ORAL at 08:30

## 2018-11-11 RX ADMIN — ALLOPURINOL 100 MG: 100 TABLET ORAL at 21:26

## 2018-11-11 RX ADMIN — CLONIDINE HYDROCHLORIDE 0.2 MG: 0.2 TABLET ORAL at 15:03

## 2018-11-11 RX ADMIN — IPRATROPIUM BROMIDE AND ALBUTEROL SULFATE 1 AMPULE: .5; 3 SOLUTION RESPIRATORY (INHALATION) at 13:07

## 2018-11-11 RX ADMIN — IPRATROPIUM BROMIDE AND ALBUTEROL SULFATE 1 AMPULE: .5; 3 SOLUTION RESPIRATORY (INHALATION) at 06:01

## 2018-11-11 RX ADMIN — IPRATROPIUM BROMIDE AND ALBUTEROL SULFATE 1 AMPULE: .5; 3 SOLUTION RESPIRATORY (INHALATION) at 09:41

## 2018-11-11 RX ADMIN — PRAVASTATIN SODIUM 40 MG: 20 TABLET ORAL at 08:30

## 2018-11-11 RX ADMIN — FUROSEMIDE 40 MG: 10 INJECTION, SOLUTION INTRAMUSCULAR; INTRAVENOUS at 08:30

## 2018-11-11 RX ADMIN — METOPROLOL SUCCINATE 100 MG: 100 TABLET, EXTENDED RELEASE ORAL at 08:30

## 2018-11-11 RX ADMIN — OXYCODONE HYDROCHLORIDE AND ACETAMINOPHEN 500 MG: 500 TABLET ORAL at 08:29

## 2018-11-11 RX ADMIN — IPRATROPIUM BROMIDE AND ALBUTEROL SULFATE 1 AMPULE: .5; 3 SOLUTION RESPIRATORY (INHALATION) at 16:17

## 2018-11-11 RX ADMIN — FUROSEMIDE 40 MG: 10 INJECTION, SOLUTION INTRAMUSCULAR; INTRAVENOUS at 21:26

## 2018-11-11 RX ADMIN — CLONIDINE HYDROCHLORIDE 0.2 MG: 0.2 TABLET ORAL at 08:30

## 2018-11-11 RX ADMIN — Medication 10 ML: at 21:26

## 2018-11-11 RX ADMIN — CHOLECALCIFEROL TAB 125 MCG (5000 UNIT) 5000 UNITS: 125 TAB at 08:30

## 2018-11-11 RX ADMIN — Medication 10 ML: at 08:31

## 2018-11-11 RX ADMIN — CLONIDINE HYDROCHLORIDE 0.2 MG: 0.2 TABLET ORAL at 21:26

## 2018-11-11 ASSESSMENT — PAIN DESCRIPTION - ORIENTATION
ORIENTATION: RIGHT
ORIENTATION: RIGHT

## 2018-11-11 ASSESSMENT — PAIN DESCRIPTION - PAIN TYPE
TYPE: ACUTE PAIN
TYPE: ACUTE PAIN

## 2018-11-11 ASSESSMENT — PAIN DESCRIPTION - DESCRIPTORS
DESCRIPTORS: ACHING;DISCOMFORT
DESCRIPTORS: ACHING;DISCOMFORT

## 2018-11-11 ASSESSMENT — PAIN DESCRIPTION - PROGRESSION: CLINICAL_PROGRESSION: NOT CHANGED

## 2018-11-11 ASSESSMENT — PAIN SCALES - GENERAL
PAINLEVEL_OUTOF10: 4
PAINLEVEL_OUTOF10: 0
PAINLEVEL_OUTOF10: 0
PAINLEVEL_OUTOF10: 3

## 2018-11-11 ASSESSMENT — PAIN DESCRIPTION - FREQUENCY: FREQUENCY: INTERMITTENT

## 2018-11-11 ASSESSMENT — PAIN DESCRIPTION - ONSET
ONSET: ON-GOING
ONSET: ON-GOING

## 2018-11-11 ASSESSMENT — PAIN DESCRIPTION - LOCATION
LOCATION: FOOT
LOCATION: FOOT

## 2018-11-11 NOTE — CONSULTS
Moves all 4 families. Proximal right upper extremity has a arteriovenous fistula with good thrill and bruit. No edema at the site. Could not appreciate mass or lesion in the right side of his neck.       Data:   Labs:  CBC with Differential:  Lab Results   Component Value Date    WBC 4.8 11/11/2018    RBC 3.58 11/11/2018    HGB 10.3 11/11/2018    HCT 35.5 11/11/2018     11/11/2018    MCV 99.2 11/11/2018    MCH 28.8 11/11/2018    MCHC 29.0 11/11/2018    RDW 18.2 11/11/2018    SEGSPCT 61 01/04/2013    LYMPHOPCT 22.4 11/10/2018    MONOPCT 12.3 11/10/2018    MYELOPCT 0.5 03/16/2017    EOSPCT 5 10/13/2010    BASOPCT 0.8 11/10/2018    MONOSABS 0.60 11/10/2018    LYMPHSABS 1.09 11/10/2018    EOSABS 0.21 11/10/2018    BASOSABS 0.04 11/10/2018     CMP:  Lab Results   Component Value Date     11/11/2018    K 5.0 11/11/2018     11/11/2018    CO2 30 11/11/2018    BUN 38 11/11/2018    CREATININE 4.1 11/11/2018    GFRAA 19 11/11/2018    LABGLOM 16 11/11/2018    GLUCOSE 112 11/11/2018    GLUCOSE 126 06/04/2012    PROT 7.6 11/11/2018    LABALBU 3.6 11/11/2018    LABALBU 3.9 04/26/2012    CALCIUM 9.0 11/11/2018    BILITOT 3.0 11/11/2018    ALKPHOS 63 11/11/2018    AST 29 11/11/2018    ALT 8 11/11/2018     Ionized Calcium:  No results found for: IONCA  Magnesium:    Lab Results   Component Value Date    MG 2.1 11/11/2018     Phosphorus:    Lab Results   Component Value Date    PHOS 4.1 11/11/2018     U/A:  Lab Results   Component Value Date    COLORU Yellow 11/10/2018    PHUR 6.0 11/10/2018    LABCAST RARE 10/19/2017    WBCUA 1-3 11/10/2018    WBCUA 1-3 04/20/2012    RBCUA 1-3 11/10/2018    RBCUA >20 01/03/2013    BACTERIA NONE 11/10/2018    CLARITYU Clear 11/10/2018    SPECGRAV 1.025 11/10/2018    LEUKOCYTESUR Negative 11/10/2018    UROBILINOGEN 4.0 11/10/2018    BILIRUBINUR Negative 11/10/2018    BILIRUBINUR NEGATIVE 04/20/2012    BLOODU TRACE 11/10/2018    GLUCOSEU Negative 11/10/2018    GLUCOSEU NEGATIVE warranted    Recommendations  1. Continue current IV diuretic therapy  2. No urgent need to initiate dialysis  3. STAN not yet warranted  4. Avoid nephrotoxins, including contrast  5. Follow labs, UO  6. Consider consultation with Dr. Bassam Dempsey (which may necessitate transfer to [facility], or follow-up closely as an outpatient)  7. Continue current antihypertensive medication regimen including the losartan for now  8. Check iron profile and ferritin in the morning, supplement as warranted      Thank you for the opportunity to participate in the care of your pleasant patient. We look forward to following along with you.        Electronically signed by Valeria Becerra MD on 11/11/2018

## 2018-11-12 LAB
ALBUMIN SERPL-MCNC: 3.2 G/DL (ref 3.5–5.2)
ALP BLD-CCNC: 66 U/L (ref 40–129)
ALT SERPL-CCNC: 6 U/L (ref 0–40)
ANION GAP SERPL CALCULATED.3IONS-SCNC: 11 MMOL/L (ref 7–16)
AST SERPL-CCNC: 12 U/L (ref 0–39)
BILIRUB SERPL-MCNC: 2.6 MG/DL (ref 0–1.2)
BUN BLDV-MCNC: 39 MG/DL (ref 6–20)
CALCIUM SERPL-MCNC: 8.4 MG/DL (ref 8.6–10.2)
CHLORIDE BLD-SCNC: 102 MMOL/L (ref 98–107)
CO2: 30 MMOL/L (ref 22–29)
CREAT SERPL-MCNC: 3.9 MG/DL (ref 0.7–1.2)
FERRITIN: 24 NG/ML
GFR AFRICAN AMERICAN: 20
GFR NON-AFRICAN AMERICAN: 16 ML/MIN/1.73
GLUCOSE BLD-MCNC: 120 MG/DL (ref 74–99)
HCT VFR BLD CALC: 36.1 % (ref 37–54)
HEMOGLOBIN: 10.2 G/DL (ref 12.5–16.5)
IRON SATURATION: 14 % (ref 20–55)
IRON: 51 MCG/DL (ref 59–158)
MAGNESIUM: 2.2 MG/DL (ref 1.6–2.6)
MCH RBC QN AUTO: 27.8 PG (ref 26–35)
MCHC RBC AUTO-ENTMCNC: 28.3 % (ref 32–34.5)
MCV RBC AUTO: 98.4 FL (ref 80–99.9)
PDW BLD-RTO: 17.8 FL (ref 11.5–15)
PHOSPHORUS: 3.9 MG/DL (ref 2.5–4.5)
PLATELET # BLD: 238 E9/L (ref 130–450)
PMV BLD AUTO: 9.4 FL (ref 7–12)
POTASSIUM SERPL-SCNC: 4 MMOL/L (ref 3.5–5)
RBC # BLD: 3.67 E12/L (ref 3.8–5.8)
SODIUM BLD-SCNC: 143 MMOL/L (ref 132–146)
TOTAL IRON BINDING CAPACITY: 372 MCG/DL (ref 250–450)
TOTAL PROTEIN: 6.9 G/DL (ref 6.4–8.3)
WBC # BLD: 4.7 E9/L (ref 4.5–11.5)

## 2018-11-12 PROCEDURE — 83550 IRON BINDING TEST: CPT

## 2018-11-12 PROCEDURE — G8987 SELF CARE CURRENT STATUS: HCPCS

## 2018-11-12 PROCEDURE — G8978 MOBILITY CURRENT STATUS: HCPCS | Performed by: PHYSICAL THERAPIST

## 2018-11-12 PROCEDURE — 2580000003 HC RX 258: Performed by: INTERNAL MEDICINE

## 2018-11-12 PROCEDURE — 84100 ASSAY OF PHOSPHORUS: CPT

## 2018-11-12 PROCEDURE — 83735 ASSAY OF MAGNESIUM: CPT

## 2018-11-12 PROCEDURE — 97530 THERAPEUTIC ACTIVITIES: CPT | Performed by: PHYSICAL THERAPIST

## 2018-11-12 PROCEDURE — 6370000000 HC RX 637 (ALT 250 FOR IP): Performed by: INTERNAL MEDICINE

## 2018-11-12 PROCEDURE — 80053 COMPREHEN METABOLIC PANEL: CPT

## 2018-11-12 PROCEDURE — G8988 SELF CARE GOAL STATUS: HCPCS

## 2018-11-12 PROCEDURE — 97161 PT EVAL LOW COMPLEX 20 MIN: CPT | Performed by: PHYSICAL THERAPIST

## 2018-11-12 PROCEDURE — 94640 AIRWAY INHALATION TREATMENT: CPT

## 2018-11-12 PROCEDURE — 85027 COMPLETE CBC AUTOMATED: CPT

## 2018-11-12 PROCEDURE — 2060000000 HC ICU INTERMEDIATE R&B

## 2018-11-12 PROCEDURE — 83540 ASSAY OF IRON: CPT

## 2018-11-12 PROCEDURE — 36415 COLL VENOUS BLD VENIPUNCTURE: CPT

## 2018-11-12 PROCEDURE — 6360000002 HC RX W HCPCS: Performed by: INTERNAL MEDICINE

## 2018-11-12 PROCEDURE — 2700000000 HC OXYGEN THERAPY PER DAY

## 2018-11-12 PROCEDURE — 97165 OT EVAL LOW COMPLEX 30 MIN: CPT

## 2018-11-12 PROCEDURE — 97110 THERAPEUTIC EXERCISES: CPT

## 2018-11-12 PROCEDURE — G8979 MOBILITY GOAL STATUS: HCPCS | Performed by: PHYSICAL THERAPIST

## 2018-11-12 PROCEDURE — 82728 ASSAY OF FERRITIN: CPT

## 2018-11-12 RX ADMIN — CLONIDINE HYDROCHLORIDE 0.2 MG: 0.2 TABLET ORAL at 22:13

## 2018-11-12 RX ADMIN — CLONIDINE HYDROCHLORIDE 0.2 MG: 0.2 TABLET ORAL at 08:44

## 2018-11-12 RX ADMIN — CLONIDINE HYDROCHLORIDE 0.2 MG: 0.2 TABLET ORAL at 14:29

## 2018-11-12 RX ADMIN — HEPARIN SODIUM 5000 UNITS: 5000 INJECTION INTRAVENOUS; SUBCUTANEOUS at 14:29

## 2018-11-12 RX ADMIN — FUROSEMIDE 40 MG: 10 INJECTION, SOLUTION INTRAMUSCULAR; INTRAVENOUS at 22:13

## 2018-11-12 RX ADMIN — IPRATROPIUM BROMIDE AND ALBUTEROL SULFATE 1 AMPULE: .5; 3 SOLUTION RESPIRATORY (INHALATION) at 18:24

## 2018-11-12 RX ADMIN — OXYCODONE HYDROCHLORIDE AND ACETAMINOPHEN 500 MG: 500 TABLET ORAL at 08:43

## 2018-11-12 RX ADMIN — IPRATROPIUM BROMIDE AND ALBUTEROL SULFATE 1 AMPULE: .5; 3 SOLUTION RESPIRATORY (INHALATION) at 06:21

## 2018-11-12 RX ADMIN — MELATONIN TAB 3 MG 3 MG: 3 TAB at 00:01

## 2018-11-12 RX ADMIN — FUROSEMIDE 40 MG: 10 INJECTION, SOLUTION INTRAMUSCULAR; INTRAVENOUS at 08:43

## 2018-11-12 RX ADMIN — METOLAZONE 5 MG: 2.5 TABLET ORAL at 08:44

## 2018-11-12 RX ADMIN — METOPROLOL SUCCINATE 100 MG: 100 TABLET, EXTENDED RELEASE ORAL at 22:13

## 2018-11-12 RX ADMIN — Medication 10 ML: at 08:45

## 2018-11-12 RX ADMIN — LOSARTAN POTASSIUM 50 MG: 50 TABLET ORAL at 08:44

## 2018-11-12 RX ADMIN — PRAVASTATIN SODIUM 40 MG: 20 TABLET ORAL at 08:44

## 2018-11-12 RX ADMIN — MELATONIN TAB 3 MG 3 MG: 3 TAB at 22:15

## 2018-11-12 RX ADMIN — ASPIRIN 81 MG: 81 TABLET ORAL at 08:44

## 2018-11-12 RX ADMIN — CHOLECALCIFEROL TAB 125 MCG (5000 UNIT) 5000 UNITS: 125 TAB at 08:44

## 2018-11-12 RX ADMIN — Medication 10 ML: at 22:16

## 2018-11-12 RX ADMIN — ALLOPURINOL 100 MG: 100 TABLET ORAL at 22:15

## 2018-11-12 RX ADMIN — IPRATROPIUM BROMIDE AND ALBUTEROL SULFATE 1 AMPULE: .5; 3 SOLUTION RESPIRATORY (INHALATION) at 09:41

## 2018-11-12 RX ADMIN — METOPROLOL SUCCINATE 100 MG: 100 TABLET, EXTENDED RELEASE ORAL at 08:43

## 2018-11-12 RX ADMIN — HEPARIN SODIUM 5000 UNITS: 5000 INJECTION INTRAVENOUS; SUBCUTANEOUS at 22:13

## 2018-11-12 ASSESSMENT — PAIN SCALES - GENERAL
PAINLEVEL_OUTOF10: 0

## 2018-11-12 NOTE — CONSULTS
CARDIOLOGY CONSULTATION  Coverage for Dr. Nelson Romero    Patient Name:  Adia Vital    :  1970    Reason for Consultation:   CHF in the presence of end-stage renal disease. History of Present Illness:   Adia Vital presents to [facility] history of sudden onset of swelling in his eyelids as increasing shortness of breath with any form of exertion. He denies any chest pressure and on occasion is lightheaded balance. He denies any swelling of his lips or tongue however. He denies being on any new medication nor nonsteroidals presently. 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 Surgical History:   has a past surgical history that includes Tonsillectomy and adenoidectomy; Carpal tunnel release; ECHO Compl W Dop Color Flow (2012 EF 60%); ECHO Compl W Dop Color Flow (2012); Cardiac pacemaker placement (12); AV fistula repair (Right, 13); Dialysis fistula creation (Right, 2104); and pacemaker placement. Social History:   reports that he has never smoked. He has never used smokeless tobacco. He reports that he does not drink alcohol or use drugs. Family History:  family history includes Diabetes in his mother; Kidney Disease in his mother. Mother  secondary to complications of peripheral vascular disease including loss of limbs and father  secondary to myocardial infarction. Medications:  Prior to Admission medications    Medication Sig Start Date End Date Taking?  Authorizing Provider   cloNIDine (CATAPRES) 0.2 MG tablet TAKE 1 TABLET BY MOUTH THREE TIMES DAILY 10/22/18  Yes disturbance, weakness and joint complaints. · Integumentary: No rash or pruritis. · Neurological: No headache, diplopia, change in muscle strength, numbness or tingling. No change in gait, balance, coordination, mood, affect, memory, mentation, behavior. · Psychiatric: No anxiety or depression. · Endocrine: No temperature intolerance. No excessive thirst, fluid intake, or urination. No tremor. · Hematologic/Lymphatic: No abnormal bruising or bleeding, blood clots or swollen lymph nodes. · Allergic/Immunologic: No nasal congestion or hives. Physical Examination:    Vital Signs: /69   Pulse 51   Temp 97.7 °F (36.5 °C) (Oral)   Resp 16   Ht 6' 4\" (1.93 m)   Wt (!) 381 lb (172.8 kg)   SpO2 92%   BMI 46.38 kg/m²   General appearance: Well preserved, mesomorphic body habitus, alert, no distress. Skin: Skin color, texture, turgor normal. No rashes or lesions. No induration or tightening palpated. Head: Normocephalic. No masses, lesions, tenderness or abnormalities  Eyes: Conjunctivae/corneas clear. PERRL, EOMs intact. Sclera non icteric. Ears: External ears normal. Canals clear. TM's clear bilaterally. Hearing normal to finger rub. Nose/Sinuses: Nares normal. Septum midline. Mucosa normal. No drainage or sinus tenderness. Oropharynx: Lips, mucosa, and tongue normal. Oropharynx clear with no exudate seen. Neck: Neck supple and symmetric. No adenopathy. Thyroid symmetric, normal size, without nodules. Trachea is midline. Carotids brisk in upstroke without bruits, no abnormal JVP noted at 45°. Chest: Even excursion  Lungs: Lungs clear to auscultation bilaterally. No retractions or use of accessory muscles. No tactile vocal fremitus. No rhonchi, crackles or rales. Heart:  S1 > S2. Regular rhythm. No gallop; a 2/6 systolic murmur 2nd left intercostal space. No rub, palpable thrill or heave noted. PMI 5th intercostal space midclavicular line. Abdomen: Abdomen soft, grossly protuberant, non-tender. 8/15/2018. There is a mildly prominent left neck lymph node measuring up to 1.3 cm in short axis. There is a right cervical lymph node with a preserved fatty hilum measuring 0.6 cm in short axis. 1. Anechoic cystic lesion within the left neck measuring up to 3.2 cm, which was not identified on the previous CT from 8/15/2018. 2. Mildly prominent left cervical lymph node. Xr Chest Portable    Result Date: 11/10/2018  Location:200 Exam: XR CHEST PORTABLE Indications: Shortness of breath, cardiomegaly, history of hypertension. Right eye swelling. Nonsmoker Findings: A portable AP erect inspiratory view of the chest was obtained and compared to the previous exam of 12/15/2017. Left subclavian transvenous pacemaker is not significantly changed. No acute infiltrate, pleural effusion, or pneumothorax is seen bilaterally. Cardiac silhouette remains enlarged, with prominence of the left ventricular configuration, unchanged. Mediastinal contour and visualized osseous structures are not significantly changed. No acute cardiopulmonary disease, without acute infiltrate or significant change since  12/15/2017. Us Dup Upper Extremity Right Venous    Result Date: 11/11/2018  Reading Location: 200 Indication: Arteriovenous fistula, assess for venous engorgement abnormality extending to the subclavian vein. Comparison: None available. Technique: Sonographic evaluation of the deep venous system of the right upper extremity was performed. Color-flow and spectral analysis of the waveform were performed as well. Findings: There is decreased compressibility of the jugular vein, suggestive of partial thrombus. The subclavian vein is patent. There is normal compressibility, color flow, and augmentation within the deep venous system of the right lower extremity. There is an arteriovenous fistula within the proximal arm measuring up to 3.1 cm in maximal dimension, which is grossly patent.      1. Partial thrombus within the right jugular vein. 2. No evidence of deep venous thrombosis within the right upper extremity. 3. Grossly patent arteriovenous fistula. Us Dup Lower Extremities Bilateral Venous    Result Date: 11/10/2018  Location:200 Exam: US DUP LOWER EXTREMITIES BILATERAL VENOUS Comparison: 5/31/2016 History:    Lower extremity edema Findings: Grayscale, Duplex doppler, color-flow, and spectral analysis sonography of the lower extremity deep venous system obtained from the level of the common femoral vein to the calf. Unremarkable compressibility and flow noted throughout. No evidence of echogenic intraluminal filling defect. No sonographic evidence of bilateral lower extremity deep venous thrombosis. Assessment:    Principal Problem:    Acute on chronic diastolic (congestive) heart failure (HCC)  Resolved Problems:    * No resolved hospital problems. *      Plan:  Periorbital edema secondary to subclavian vein thrombus, end-stage renal disease or even angioedema associated with any of his present medications. Additionally avoid any medications may increase further decrement of his present renal dysfunction including those that would enhance hypotension. Would also consider adding metolazone every other day will defer this decision to Kathi Robison/Nikia will also add evening nitrates with the hope of decreasing change in volume of distribution evenings. Dr. Jannet Beltran will be back tomorrow and assess his cardiac medications in the presence of renal dysfunction. I have spent more than 45 minutes face to face with Ciro Duffy reviewing notes and laboratory data with greater than 50% of this time instructing and counseling the patient regarding my findings and recommendations and I have answered all questions as posed to me by  Brina Tyrel. Thank you, Abhay Dietrich DO for allowing me to consult in the care of this patient.     Aracelis Goldstein DO, FACP, FACC, [facility]    NOTE:  This report was transcribed using

## 2018-11-12 NOTE — CARE COORDINATION
medications including Lasix. Advised to folow low sodium diet as part of HF management. Provided and reviewed handout on CHF zones and knowing when to seek medical attention. States he lives with wife in multi level home with 15 inside steps to climb. States bed/bathroom is on second level of home. States he was independent in ADL's/IADL's PTA. Denies any DME. Denies any HHC, TANNER or SNF in past. States his plan is to return home after hospital discharge. CTC scheduled TCM/HFU visit with PCP for 11/27/18 at 2 pm after calling [facility] and verifying with RECCY. Denies any concerns or needs at this time. Provided business card with CTC contact information and advised to call with any questions or concerns which he verbalizes understanding. CTC will remain available for any further needs if needed. Follow Up  Future Appointments  Date Time Provider Latosha Abdul   11/27/2018 2:00 PM Lupe Smith [facility]   12/11/2018 3:00 PM [facility]       Health Maintenance  There are no preventive care reminders to display for this patient.     MERY Ontiveros

## 2018-11-13 ENCOUNTER — HOSPITAL ENCOUNTER (OUTPATIENT)
Age: 48
Discharge: HOME | End: 2018-11-13
Payer: MEDICARE

## 2018-11-13 VITALS
WEIGHT: 315 LBS | HEIGHT: 76 IN | RESPIRATION RATE: 18 BRPM | OXYGEN SATURATION: 90 % | DIASTOLIC BLOOD PRESSURE: 66 MMHG | SYSTOLIC BLOOD PRESSURE: 139 MMHG | HEART RATE: 64 BPM | TEMPERATURE: 97.8 F | BODY MASS INDEX: 38.36 KG/M2

## 2018-11-13 LAB
ALBUMIN SERPL-MCNC: 3.7 G/DL (ref 3.5–5.2)
ALP BLD-CCNC: 73 U/L (ref 40–129)
ALT SERPL-CCNC: 6 U/L (ref 0–40)
ANION GAP SERPL CALCULATED.3IONS-SCNC: 13 MMOL/L (ref 7–16)
APTT: 37.9 SEC (ref 24.5–35.1)
AST SERPL-CCNC: 13 U/L (ref 0–39)
BILIRUB SERPL-MCNC: 2.7 MG/DL (ref 0–1.2)
BUN BLDV-MCNC: 38 MG/DL (ref 6–20)
CALCIUM SERPL-MCNC: 8.9 MG/DL (ref 8.6–10.2)
CHLORIDE BLD-SCNC: 100 MMOL/L (ref 98–107)
CO2: 32 MMOL/L (ref 22–29)
CREAT SERPL-MCNC: 4 MG/DL (ref 0.7–1.2)
GFR AFRICAN AMERICAN: 19
GFR NON-AFRICAN AMERICAN: 16 ML/MIN/1.73
GLUCOSE BLD-MCNC: 97 MG/DL (ref 74–99)
HCT VFR BLD CALC: 36.3 % (ref 37–54)
HCT VFR BLD CALC: 38.2 % (ref 37–54)
HEMOGLOBIN: 10.4 G/DL (ref 12.5–16.5)
HEMOGLOBIN: 10.8 G/DL (ref 12.5–16.5)
MCH RBC QN AUTO: 27.9 PG (ref 26–35)
MCH RBC QN AUTO: 28.1 PG (ref 26–35)
MCHC RBC AUTO-ENTMCNC: 28.3 % (ref 32–34.5)
MCHC RBC AUTO-ENTMCNC: 28.7 % (ref 32–34.5)
MCV RBC AUTO: 98.1 FL (ref 80–99.9)
MCV RBC AUTO: 98.7 FL (ref 80–99.9)
PDW BLD-RTO: 17.8 FL (ref 11.5–15)
PDW BLD-RTO: 17.9 FL (ref 11.5–15)
PLATELET # BLD: 247 E9/L (ref 130–450)
PLATELET # BLD: 263 E9/L (ref 130–450)
PMV BLD AUTO: 9.1 FL (ref 7–12)
PMV BLD AUTO: 9.6 FL (ref 7–12)
POTASSIUM SERPL-SCNC: 4 MMOL/L (ref 3.5–5)
RBC # BLD: 3.7 E12/L (ref 3.8–5.8)
RBC # BLD: 3.87 E12/L (ref 3.8–5.8)
SODIUM BLD-SCNC: 145 MMOL/L (ref 132–146)
TOTAL PROTEIN: 8.1 G/DL (ref 6.4–8.3)
WBC # BLD: 4.3 E9/L (ref 4.5–11.5)
WBC # BLD: 5.2 E9/L (ref 4.5–11.5)

## 2018-11-13 PROCEDURE — A0425 GROUND MILEAGE: HCPCS

## 2018-11-13 PROCEDURE — 6360000002 HC RX W HCPCS: Performed by: INTERNAL MEDICINE

## 2018-11-13 PROCEDURE — 94640 AIRWAY INHALATION TREATMENT: CPT

## 2018-11-13 PROCEDURE — 6370000000 HC RX 637 (ALT 250 FOR IP): Performed by: INTERNAL MEDICINE

## 2018-11-13 PROCEDURE — 36415 COLL VENOUS BLD VENIPUNCTURE: CPT

## 2018-11-13 PROCEDURE — 2580000003 HC RX 258: Performed by: INTERNAL MEDICINE

## 2018-11-13 PROCEDURE — 2700000000 HC OXYGEN THERAPY PER DAY

## 2018-11-13 PROCEDURE — 85730 THROMBOPLASTIN TIME PARTIAL: CPT

## 2018-11-13 PROCEDURE — 85027 COMPLETE CBC AUTOMATED: CPT

## 2018-11-13 PROCEDURE — 80053 COMPREHEN METABOLIC PANEL: CPT

## 2018-11-13 PROCEDURE — A0426 ALS 1: HCPCS

## 2018-11-13 RX ORDER — HEPARIN SODIUM 10000 [USP'U]/100ML
5.8 INJECTION, SOLUTION INTRAVENOUS CONTINUOUS
Status: DISCONTINUED | OUTPATIENT
Start: 2018-11-13 | End: 2018-11-13 | Stop reason: HOSPADM

## 2018-11-13 RX ORDER — HEPARIN SODIUM 1000 [USP'U]/ML
4000 INJECTION, SOLUTION INTRAVENOUS; SUBCUTANEOUS ONCE
Status: COMPLETED | OUTPATIENT
Start: 2018-11-13 | End: 2018-11-13

## 2018-11-13 RX ORDER — METOPROLOL SUCCINATE 100 MG/1
100 TABLET, EXTENDED RELEASE ORAL 2 TIMES DAILY
Qty: 30 TABLET | Refills: 3 | Status: SHIPPED | OUTPATIENT
Start: 2018-11-13 | End: 2019-03-06 | Stop reason: SDUPTHER

## 2018-11-13 RX ORDER — METOLAZONE 5 MG/1
5 TABLET ORAL
Qty: 30 TABLET | Refills: 3 | Status: SHIPPED | OUTPATIENT
Start: 2018-11-14 | End: 2019-02-12

## 2018-11-13 RX ORDER — HEPARIN SODIUM 1000 [USP'U]/ML
4000 INJECTION, SOLUTION INTRAVENOUS; SUBCUTANEOUS PRN
Status: DISCONTINUED | OUTPATIENT
Start: 2018-11-13 | End: 2018-11-13 | Stop reason: HOSPADM

## 2018-11-13 RX ORDER — HEPARIN SODIUM 1000 [USP'U]/ML
2000 INJECTION, SOLUTION INTRAVENOUS; SUBCUTANEOUS PRN
Status: DISCONTINUED | OUTPATIENT
Start: 2018-11-13 | End: 2018-11-13 | Stop reason: HOSPADM

## 2018-11-13 RX ADMIN — IPRATROPIUM BROMIDE AND ALBUTEROL SULFATE 1 AMPULE: .5; 3 SOLUTION RESPIRATORY (INHALATION) at 09:21

## 2018-11-13 RX ADMIN — METOPROLOL SUCCINATE 100 MG: 100 TABLET, EXTENDED RELEASE ORAL at 09:01

## 2018-11-13 RX ADMIN — PRAVASTATIN SODIUM 40 MG: 20 TABLET ORAL at 09:01

## 2018-11-13 RX ADMIN — FUROSEMIDE 40 MG: 10 INJECTION, SOLUTION INTRAMUSCULAR; INTRAVENOUS at 09:01

## 2018-11-13 RX ADMIN — HEPARIN SODIUM 5000 UNITS: 5000 INJECTION INTRAVENOUS; SUBCUTANEOUS at 06:33

## 2018-11-13 RX ADMIN — CHOLECALCIFEROL TAB 125 MCG (5000 UNIT) 5000 UNITS: 125 TAB at 09:01

## 2018-11-13 RX ADMIN — Medication 10 ML: at 09:01

## 2018-11-13 RX ADMIN — ASPIRIN 81 MG: 81 TABLET ORAL at 09:01

## 2018-11-13 RX ADMIN — OXYCODONE HYDROCHLORIDE AND ACETAMINOPHEN 500 MG: 500 TABLET ORAL at 09:01

## 2018-11-13 RX ADMIN — HEPARIN SODIUM 4000 UNITS: 1000 INJECTION, SOLUTION INTRAVENOUS; SUBCUTANEOUS at 09:02

## 2018-11-13 RX ADMIN — CLONIDINE HYDROCHLORIDE 0.2 MG: 0.2 TABLET ORAL at 09:01

## 2018-11-13 RX ADMIN — HEPARIN SODIUM 5.8 UNITS/KG/HR: 10000 INJECTION, SOLUTION INTRAVENOUS at 09:03

## 2018-11-13 RX ADMIN — LOSARTAN POTASSIUM 50 MG: 50 TABLET ORAL at 09:01

## 2018-11-13 RX ADMIN — IPRATROPIUM BROMIDE AND ALBUTEROL SULFATE 1 AMPULE: .5; 3 SOLUTION RESPIRATORY (INHALATION) at 05:07

## 2018-11-13 NOTE — PROGRESS NOTES
[facility], [address]         Phone: [phone]  Fax: [phone] [employer], [address] [place] [address]   Phone: [phone]  Fax: [phone]    Aly Jay M.D. YOGESH Alejandro M.D.                   11/13/2018   Melvina Martinez 1970 11/12 patient seen at bedside swelling and face looks better  11/13 patient seen resting no complaints nausea vomiting diarrhea shortness of breath    Creatinine noted    Arteriovenous fistula with good thrill and bruit      History of Present Ilness:    Mr. Sam Corrales is a pleasant 59-year-old gentleman with advanced chronic kidney disease, followed longitudinally from a nephrology standpoint by Dr. Guillermo Romero. He is CK D stage IV, baseline serum creatinine 3.8-4.1 mg/dL, estimated GFR around 20 mL per minute. Roughly one year ago he had a right upper extremity AV fistula placed by .       He presented yesterday to [facility] for a CAT scan to evaluate cystic lesions on the right side of his neck. The CT scan actually noted only prominent lymph nodes in the left side of his neck. Duplex ultrasound of his neck revealed right IJ thrombus, partially occlusive.       His wife encouraged him to come to the ER for evaluation of worsening chronic symptomatology symptomatology. For the past 2 weeks Mr. Edith Brower had progressive periorbital edema, facial edema, dyspnea with exertion to the point where he needs a ride around in a motorized scooter to do his grocery shopping. He cannot walk from the bedroom to the bathroom without becoming dyspneic. He is severe fatigue and malaise and very poor exercise tolerance is well.   He
Spoke with nursing, this patient is established with Dr. Sebastián Carvajal and is not admitted for an ENT concern. May follow up with Dr. Sebastián Carvajal upon D/C. Dr. Inder Wolf.  Otolaryngology Facial Plastic Surgery  : [facility] Otolaryngology/Facial Plastic Surgery Residency    Associate Clinical Professor: Marisela Gonzalez [facility]
S2, no S3 or S4, 2 to 3/6 systolic murmur at the apex, 2 to 3/6 systolic murmur at the left lower sternal border,+ pedal edema, no carotid bruit, no JVD, no pulsating masses. ABDOMEN:  soft, nontender, no hepatomegaly, no splenomegaly, bowel sounds positive. MUSCULOSKELETAL:  No clubbing, no cyanosis, no pedal edema,there is no redness, warmth, or swelling of the joints, full range of motion noted. NEUROLOGIC:  Alert, awake, oriented  3. Cardiographics  I personally reviewed the telemetry monitor strips with the following interpretation:  Echocardiogram: not done    Imaging  US DUP UPPER EXTREMITY RIGHT VENOUS   Final Result   1. Partial thrombus within the right jugular vein. 2. No evidence of deep venous thrombosis within the right upper   extremity. 3. Grossly patent arteriovenous fistula. US DUP LOWER EXTREMITIES BILATERAL VENOUS   Final Result   No sonographic evidence of bilateral lower extremity deep   venous thrombosis. XR CHEST PORTABLE   Final Result   No acute cardiopulmonary disease, without acute infiltrate   or significant change since  12/15/2017. Lab Review   Lab Results   Component Value Date     11/12/2018    K 4.0 11/12/2018     11/12/2018    CO2 30 11/12/2018    BUN 39 11/12/2018    CREATININE 3.9 11/12/2018    GLUCOSE 120 11/12/2018    GLUCOSE 126 06/04/2012    CALCIUM 8.4 11/12/2018     Lab Results   Component Value Date    WBC 4.3 11/13/2018    HGB 10.8 11/13/2018    HCT 38.2 11/13/2018    MCV 98.7 11/13/2018     11/13/2018     I have personally reviewed the laboratory, cardiac diagnostic and radiographic testing as outlined above:    Assessment: 1. Acute on chronic congestive heart failure: Combined, decompensated, continue current treatment  2. Right jugular vein thrombosis: will anticoagulate  3. Cardiomyopathy: non-ischemic, systolic, Lexiscan stress test 04/2016 which demonstrated fixed defect in inferior wall, inferior and lateral
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 Acutely decompensated diastolic congestive heart failure  3. Chronic kidney disease stage IV  4. Left neck cystic lesion seen on ultrasound  5. Pacemaker in situ  6. Nonischemic cardiomyopathy  7. Paroxysmal atrial fibrillation, on 81mg aspirin  8. Hypertension  9. Hyperlipidemia  10. Morbid obesity secondary to increased calorie intake    Plan: We will maintain IV diuresis for the underlying decompensated congestive heart failure. We will obtain more accurate I's and O's. The patient's facial swelling seems to be improving. I do not believe transferred to [facility] is absolutely necessary at this time. I have contacted Dr. Roxana Rabago office and arranged an outpatient appointment this Thursday. I anticipate the need for further vascular intervention for his underlying venous issues related to his AV fistula.     Claudene Batters, D.O.  10:59 AM  11/12/2018
treatment  2. Right jugular vein thrombosis: Heparin drip has been started. 3. Cardiomyopathy: non-ischemic, systolic, Lexiscan stress test 04/2016 which demonstrated fixed defect in inferior wall, inferior and lateral wall hypokinesis. Cardiac catheterization 2005 demonstrating no obstructive coronary artery disease  4. Atrial fibrillation: chronic, currently CVR, will start anticoagulation  5. Pacemaker in situ   6. Hypertension: not well controlled  7. Hyperlipidemia: on statin  8. Chronic kidney disease: stage 4      Recommendations:     1. Will continue current treatment   2. Increase ambulation as tolerated  3. Further cardiac recommendations forthcoming pending patients clinical progression and diagnostic test findings        Discussed with patient and his family at bedside  Discussed with Dr. Colleen Cherry     Electronically signed by MERY Sandoval CNP on 11/13/2018 at 11:06 AM  I have discussed the care of patient including pertinent history and exam and reviewed chart, vitals, labs and radiologic studies. I also participated in medical decision making with Alma Lincoln CNP on the date of service and I agree with all of the pertinent clinical information, assessment and treatment plan and status of the problem list as documented and have edited it. NOTE: This report was transcribed using voice recognition software.  Every effort was made to ensure accuracy; however, inadvertent computerized transcription errors may be present

## 2018-11-13 NOTE — DISCHARGE SUMMARY
as follows:  1. Anechoic cystic lesion within the left neck measuring up to 3.2 cm,   which was not identified on the previous CT from 8/15/2018. 2. Mildly prominent left cervical lymph node. Unfortunately, a contrasted CT is unable to be done secondary to patient's renal function. Patient is not yet on dialysis however does have an AV fistula in his right arm. In March 2017 patient had a right upper arm aneurysm of his AVF and had a balloon angioplasty due to stenosis both his subclavian vein and right cephalic arch. Patient is concerned that his current symptoms may indicate he is in need for a repeat balloon angioplasty. LABS[de-identified]  Lab Results   Component Value Date    WBC 5.2 11/13/2018    HGB 10.4 (L) 11/13/2018    HCT 36.3 (L) 11/13/2018     11/13/2018     11/13/2018    K 4.0 11/13/2018     11/13/2018    CREATININE 4.0 (H) 11/13/2018    BUN 38 (H) 11/13/2018    CO2 32 (H) 11/13/2018    GLUCOSE 97 11/13/2018    ALT 6 11/13/2018    AST 13 11/13/2018    INR 1.2 03/14/2017     Lab Results   Component Value Date    INR 1.2 03/14/2017    INR 1.4 05/30/2016    INR 1.2 02/28/2014    PROTIME 14.1 (H) 03/14/2017    PROTIME 14.5 (H) 05/30/2016    PROTIME 13.0 02/28/2014      Lab Results   Component Value Date    TSH 3.040 11/11/2018     Lab Results   Component Value Date    TRIG 151 (H) 11/11/2018    TRIG 146 03/15/2017    TRIG 111 06/09/2016     Lab Results   Component Value Date    HDL 20 11/11/2018    HDL 17 03/15/2017    HDL 22 06/09/2016     Lab Results   Component Value Date    LDLCALC 66 11/11/2018    LDLCALC 82 03/15/2017    LDLCALC 115 (H) 06/09/2016     Lab Results   Component Value Date    LABA1C 5.4 11/11/2018       IMAGING:  Us Head Neck Soft Tissue Thyroid    Result Date: 11/10/2018  Reading location:  200 Indication: Neck mass. Comparison: CT neck from 8/15/2018.  Technique: Real-time grayscale and color Doppler ultrasound of the neck in the region of the patient's palpable Ascorbic Acid (VITAMIN C) 500 MG tablet  Take 500 mg by mouth daily             aspirin (ASPIRIN LOW DOSE) 81 MG EC tablet  TAKE ONE (1) TABLET BY MOUTH ONCE DAILY             Cholecalciferol (VITAMIN D3) 5000 UNITS CAPS  Take 1 capsule by mouth daily             cloNIDine (CATAPRES) 0.2 MG tablet  TAKE 1 TABLET BY MOUTH THREE TIMES DAILY             furosemide (LASIX) 40 MG tablet  TAKE ONE TABLET BY MOUTH THREE TIMES DAILY             losartan (COZAAR) 50 MG tablet  Take 50 mg by mouth daily             melatonin 3 MG TABS tablet  Take 1 tablet by mouth daily             metolazone (ZAROXOLYN) 5 MG tablet  Take 1 tablet by mouth three times a week             metoprolol succinate (TOPROL XL) 100 MG extended release tablet  Take 1 tablet by mouth 2 times daily             pravastatin (PRAVACHOL) 40 MG tablet  Take 1 tablet by mouth daily                 FOLLOW UP/INSTRUCTIONS:  · This patient is instructed to follow-up with his primary care physician. · Patient is instructed to follow-up with the consults listed above as directed by them. · he is instructed to resume home medications and take new medications as indicated in the list above. · If the patient has a recurrence of symptoms, he is instructed to go to the ED. Preparing for this patient's discharge, including paperwork, orders, instructions, and meeting with patient did require > 30 minutes.     Tomas Pina DO     11/13/2018  11:50 AM

## 2018-11-14 ENCOUNTER — CARE COORDINATION (OUTPATIENT)
Dept: CARE COORDINATION | Age: 48
End: 2018-11-14

## 2018-11-14 ENCOUNTER — CARE COORDINATION (OUTPATIENT)
Dept: CASE MANAGEMENT | Age: 48
End: 2018-11-14

## 2018-11-14 NOTE — CARE COORDINATION
CTC spoke briefly with patient wife Yelitza Storm) who confirms patient was transferred to [facility] yesterday d/t blood clot in neck and is under care of Dr. Natalya Nath (vascular sx). CTC will sign off for Care Transition at this time. WIll update ACC (Vilma Machado) as patient is followed for Ambulatory Care Coordination.

## 2018-11-16 ENCOUNTER — CARE COORDINATION (OUTPATIENT)
Dept: CARE COORDINATION | Age: 48
End: 2018-11-16

## 2018-11-16 NOTE — CARE COORDINATION
Received a message from Lady Cutler a  from [facility] saying that patient as at their facility but she has not been assigned to her and could not tell me more  - called [facility] and  verified that patient was at their facility and forwarded my call to CCU    Spoke with Kade Winston in the unit who said that patient was there, that he will soon be transferred to a general floor and let her know will update PCP and call back next week for update.

## 2018-11-19 ENCOUNTER — CARE COORDINATION (OUTPATIENT)
Dept: CARE COORDINATION | Age: 48
End: 2018-11-19

## 2018-11-21 ENCOUNTER — CARE COORDINATION (OUTPATIENT)
Dept: CARE COORDINATION | Age: 48
End: 2018-11-21

## 2018-11-21 DIAGNOSIS — I48.91 ATRIAL FIBRILLATION, UNSPECIFIED TYPE (HCC): Primary | Chronic | ICD-10-CM

## 2018-11-21 DIAGNOSIS — Z79.01 ANTICOAGULATED ON COUMADIN: ICD-10-CM

## 2018-11-26 ENCOUNTER — CARE COORDINATION (OUTPATIENT)
Dept: CARE COORDINATION | Age: 48
End: 2018-11-26

## 2018-11-28 ENCOUNTER — CARE COORDINATION (OUTPATIENT)
Dept: CARE COORDINATION | Age: 48
End: 2018-11-28

## 2018-11-30 ENCOUNTER — CARE COORDINATION (OUTPATIENT)
Dept: CARE COORDINATION | Age: 48
End: 2018-11-30

## 2018-12-10 ENCOUNTER — CARE COORDINATION (OUTPATIENT)
Dept: CARE COORDINATION | Age: 48
End: 2018-12-10

## 2018-12-10 RX ORDER — LINEZOLID 600 MG/1
600 TABLET, FILM COATED ORAL 2 TIMES DAILY
COMMUNITY
End: 2019-02-12

## 2018-12-11 ENCOUNTER — OFFICE VISIT (OUTPATIENT)
Dept: INTERNAL MEDICINE CLINIC | Age: 48
End: 2018-12-11
Payer: MEDICARE

## 2018-12-11 VITALS
HEART RATE: 98 BPM | OXYGEN SATURATION: 95 % | TEMPERATURE: 97.9 F | DIASTOLIC BLOOD PRESSURE: 67 MMHG | HEIGHT: 76 IN | SYSTOLIC BLOOD PRESSURE: 113 MMHG | BODY MASS INDEX: 38.36 KG/M2 | WEIGHT: 315 LBS

## 2018-12-11 DIAGNOSIS — G47.33 OBSTRUCTIVE SLEEP APNEA SYNDROME: ICD-10-CM

## 2018-12-11 DIAGNOSIS — I10 ESSENTIAL HYPERTENSION: ICD-10-CM

## 2018-12-11 DIAGNOSIS — N18.4 CKD (CHRONIC KIDNEY DISEASE) STAGE 4, GFR 15-29 ML/MIN (HCC): Chronic | ICD-10-CM

## 2018-12-11 DIAGNOSIS — I48.91 ATRIAL FIBRILLATION, UNSPECIFIED TYPE (HCC): Primary | Chronic | ICD-10-CM

## 2018-12-11 DIAGNOSIS — I82.890 JUGULAR VEIN THROMBOSIS, RIGHT: ICD-10-CM

## 2018-12-11 PROCEDURE — G8417 CALC BMI ABV UP PARAM F/U: HCPCS | Performed by: INTERNAL MEDICINE

## 2018-12-11 PROCEDURE — 1036F TOBACCO NON-USER: CPT | Performed by: INTERNAL MEDICINE

## 2018-12-11 PROCEDURE — 99213 OFFICE O/P EST LOW 20 MIN: CPT | Performed by: FAMILY MEDICINE

## 2018-12-11 PROCEDURE — G8427 DOCREV CUR MEDS BY ELIG CLIN: HCPCS | Performed by: INTERNAL MEDICINE

## 2018-12-11 PROCEDURE — G8484 FLU IMMUNIZE NO ADMIN: HCPCS | Performed by: INTERNAL MEDICINE

## 2018-12-11 PROCEDURE — 1111F DSCHRG MED/CURRENT MED MERGE: CPT | Performed by: INTERNAL MEDICINE

## 2018-12-11 PROCEDURE — 99213 OFFICE O/P EST LOW 20 MIN: CPT | Performed by: INTERNAL MEDICINE

## 2018-12-11 ASSESSMENT — PATIENT HEALTH QUESTIONNAIRE - PHQ9
SUM OF ALL RESPONSES TO PHQ9 QUESTIONS 1 & 2: 0
2. FEELING DOWN, DEPRESSED OR HOPELESS: 0
1. LITTLE INTEREST OR PLEASURE IN DOING THINGS: 0
SUM OF ALL RESPONSES TO PHQ QUESTIONS 1-9: 0
SUM OF ALL RESPONSES TO PHQ QUESTIONS 1-9: 0

## 2018-12-12 ENCOUNTER — HOSPITAL ENCOUNTER (OUTPATIENT)
Age: 48
Discharge: HOME | End: 2018-12-12
Payer: MEDICARE

## 2018-12-12 DIAGNOSIS — I48.91 ATRIAL FIBRILLATION, UNSPECIFIED TYPE (HCC): Chronic | ICD-10-CM

## 2018-12-12 DIAGNOSIS — I10 ESSENTIAL HYPERTENSION: ICD-10-CM

## 2018-12-12 LAB
ALBUMIN SERPL-MCNC: 3.8 G/DL (ref 3.5–5.2)
ALP BLD-CCNC: 67 U/L (ref 40–129)
ALT SERPL-CCNC: 10 U/L (ref 0–40)
ANION GAP SERPL CALCULATED.3IONS-SCNC: 13 MMOL/L (ref 7–16)
AST SERPL-CCNC: 11 U/L (ref 0–39)
BILIRUB SERPL-MCNC: 1.7 MG/DL (ref 0–1.2)
BUN BLDV-MCNC: 51 MG/DL (ref 6–20)
CALCIUM SERPL-MCNC: 9.1 MG/DL (ref 8.6–10.2)
CHLORIDE BLD-SCNC: 103 MMOL/L (ref 98–107)
CO2: 28 MMOL/L (ref 22–29)
CREAT SERPL-MCNC: 5.1 MG/DL (ref 0.7–1.2)
GFR AFRICAN AMERICAN: 15
GFR NON-AFRICAN AMERICAN: 12 ML/MIN/1.73
GLUCOSE BLD-MCNC: 90 MG/DL (ref 74–99)
HCT VFR BLD CALC: 34.2 % (ref 37–54)
HEMOGLOBIN: 9.8 G/DL (ref 12.5–16.5)
INR BLD: 1.8
MCH RBC QN AUTO: 28.2 PG (ref 26–35)
MCHC RBC AUTO-ENTMCNC: 28.7 % (ref 32–34.5)
MCV RBC AUTO: 98.6 FL (ref 80–99.9)
PDW BLD-RTO: 17.9 FL (ref 11.5–15)
PLATELET # BLD: 240 E9/L (ref 130–450)
PMV BLD AUTO: 10.5 FL (ref 7–12)
POTASSIUM SERPL-SCNC: 4.7 MMOL/L (ref 3.5–5)
PROTHROMBIN TIME: 19.8 SEC (ref 9.3–12.4)
RBC # BLD: 3.47 E12/L (ref 3.8–5.8)
SODIUM BLD-SCNC: 144 MMOL/L (ref 132–146)
TOTAL PROTEIN: 8.2 G/DL (ref 6.4–8.3)
WBC # BLD: 4.1 E9/L (ref 4.5–11.5)

## 2018-12-12 PROCEDURE — 36415 COLL VENOUS BLD VENIPUNCTURE: CPT

## 2018-12-12 PROCEDURE — 80053 COMPREHEN METABOLIC PANEL: CPT

## 2018-12-12 PROCEDURE — 85027 COMPLETE CBC AUTOMATED: CPT

## 2018-12-12 PROCEDURE — 85610 PROTHROMBIN TIME: CPT

## 2018-12-14 ENCOUNTER — TELEPHONE (OUTPATIENT)
Dept: INTERNAL MEDICINE CLINIC | Age: 48
End: 2018-12-14

## 2018-12-14 DIAGNOSIS — I82.890 JUGULAR VEIN THROMBOSIS, RIGHT: Primary | ICD-10-CM

## 2018-12-17 RX ORDER — ALLOPURINOL 100 MG/1
TABLET ORAL
Qty: 30 TABLET | Refills: 5 | Status: SHIPPED | OUTPATIENT
Start: 2018-12-17 | End: 2019-02-05

## 2018-12-17 RX ORDER — WARFARIN SODIUM 6 MG/1
6 TABLET ORAL DAILY
Qty: 30 TABLET | Refills: 3 | Status: SHIPPED | OUTPATIENT
Start: 2018-12-17 | End: 2019-02-28 | Stop reason: ALTCHOICE

## 2018-12-18 ENCOUNTER — CARE COORDINATION (OUTPATIENT)
Dept: CARE COORDINATION | Age: 48
End: 2018-12-18

## 2018-12-18 DIAGNOSIS — I82.890 JUGULAR VEIN THROMBOSIS, RIGHT: Primary | ICD-10-CM

## 2018-12-18 DIAGNOSIS — Z79.01 ANTICOAGULATED ON COUMADIN: ICD-10-CM

## 2018-12-20 ENCOUNTER — HOSPITAL ENCOUNTER (OUTPATIENT)
Age: 48
Discharge: HOME | End: 2018-12-20
Payer: MEDICARE

## 2018-12-20 LAB
ANION GAP SERPL CALCULATED.3IONS-SCNC: 12 MMOL/L (ref 7–16)
BUN BLDV-MCNC: 58 MG/DL (ref 6–20)
CALCIUM SERPL-MCNC: 9 MG/DL (ref 8.6–10.2)
CHLORIDE BLD-SCNC: 101 MMOL/L (ref 98–107)
CO2: 31 MMOL/L (ref 22–29)
CREAT SERPL-MCNC: 4.5 MG/DL (ref 0.7–1.2)
GFR AFRICAN AMERICAN: 17
GFR NON-AFRICAN AMERICAN: 14 ML/MIN/1.73
GLUCOSE FASTING: 110 MG/DL (ref 74–99)
POTASSIUM SERPL-SCNC: 4.3 MMOL/L (ref 3.5–5)
SODIUM BLD-SCNC: 144 MMOL/L (ref 132–146)

## 2018-12-20 PROCEDURE — 80048 BASIC METABOLIC PNL TOTAL CA: CPT

## 2018-12-20 PROCEDURE — 36415 COLL VENOUS BLD VENIPUNCTURE: CPT

## 2018-12-20 RX ORDER — CLONIDINE HYDROCHLORIDE 0.2 MG/1
TABLET ORAL
Qty: 90 TABLET | Refills: 3 | Status: SHIPPED | OUTPATIENT
Start: 2018-12-20 | End: 2019-05-20 | Stop reason: SDUPTHER

## 2018-12-27 ENCOUNTER — HOSPITAL ENCOUNTER (OUTPATIENT)
Age: 48
Discharge: HOME | End: 2018-12-27
Payer: MEDICARE

## 2018-12-27 LAB
ANION GAP SERPL CALCULATED.3IONS-SCNC: 12 MMOL/L (ref 7–16)
BUN BLDV-MCNC: 47 MG/DL (ref 6–20)
CALCIUM SERPL-MCNC: 9 MG/DL (ref 8.6–10.2)
CHLORIDE BLD-SCNC: 100 MMOL/L (ref 98–107)
CO2: 31 MMOL/L (ref 22–29)
CREAT SERPL-MCNC: 4.9 MG/DL (ref 0.7–1.2)
GFR AFRICAN AMERICAN: 15
GFR NON-AFRICAN AMERICAN: 13 ML/MIN/1.73
GLUCOSE BLD-MCNC: 120 MG/DL (ref 74–99)
POTASSIUM SERPL-SCNC: 4.3 MMOL/L (ref 3.5–5)
SODIUM BLD-SCNC: 143 MMOL/L (ref 132–146)

## 2018-12-27 PROCEDURE — 36415 COLL VENOUS BLD VENIPUNCTURE: CPT

## 2018-12-27 PROCEDURE — 80048 BASIC METABOLIC PNL TOTAL CA: CPT

## 2018-12-30 PROBLEM — Z79.01 ANTICOAGULATED ON COUMADIN: Status: ACTIVE | Noted: 2018-12-30

## 2018-12-31 ENCOUNTER — HOSPITAL ENCOUNTER (OUTPATIENT)
Age: 48
Discharge: HOME | End: 2018-12-31
Payer: MEDICARE

## 2018-12-31 LAB
INR BLD: 2.2
PROTHROMBIN TIME: 24.3 SEC (ref 9.3–12.4)

## 2018-12-31 PROCEDURE — 85610 PROTHROMBIN TIME: CPT

## 2018-12-31 PROCEDURE — 36415 COLL VENOUS BLD VENIPUNCTURE: CPT

## 2019-01-07 ENCOUNTER — OFFICE VISIT (OUTPATIENT)
Dept: VASCULAR SURGERY | Age: 49
End: 2019-01-07
Payer: MEDICARE

## 2019-01-07 VITALS — SYSTOLIC BLOOD PRESSURE: 126 MMHG | DIASTOLIC BLOOD PRESSURE: 78 MMHG | HEART RATE: 70 BPM

## 2019-01-07 DIAGNOSIS — I82.890 JUGULAR VEIN THROMBOSIS, RIGHT: Primary | ICD-10-CM

## 2019-01-07 PROCEDURE — G8484 FLU IMMUNIZE NO ADMIN: HCPCS | Performed by: SURGERY

## 2019-01-07 PROCEDURE — 1036F TOBACCO NON-USER: CPT | Performed by: SURGERY

## 2019-01-07 PROCEDURE — G8427 DOCREV CUR MEDS BY ELIG CLIN: HCPCS | Performed by: SURGERY

## 2019-01-07 PROCEDURE — G8417 CALC BMI ABV UP PARAM F/U: HCPCS | Performed by: SURGERY

## 2019-01-07 PROCEDURE — 99205 OFFICE O/P NEW HI 60 MIN: CPT | Performed by: SURGERY

## 2019-01-14 ENCOUNTER — CARE COORDINATION (OUTPATIENT)
Dept: CARE COORDINATION | Age: 49
End: 2019-01-14

## 2019-01-15 ENCOUNTER — HOSPITAL ENCOUNTER (OUTPATIENT)
Dept: SLEEP CENTER | Age: 49
Discharge: HOME | End: 2019-01-15
Payer: MEDICARE

## 2019-01-15 DIAGNOSIS — G47.33 OSA (OBSTRUCTIVE SLEEP APNEA): Primary | ICD-10-CM

## 2019-01-15 PROCEDURE — 2700000000 HC OXYGEN THERAPY PER DAY

## 2019-01-15 PROCEDURE — 95811 POLYSOM 6/>YRS CPAP 4/> PARM: CPT

## 2019-01-17 ENCOUNTER — HOSPITAL ENCOUNTER (OUTPATIENT)
Age: 49
Discharge: HOME | End: 2019-01-17
Payer: MEDICARE

## 2019-01-17 LAB
INR BLD: 2
PROTHROMBIN TIME: 22.3 SEC (ref 9.3–12.4)

## 2019-01-17 PROCEDURE — 36415 COLL VENOUS BLD VENIPUNCTURE: CPT

## 2019-01-17 PROCEDURE — 85610 PROTHROMBIN TIME: CPT

## 2019-01-22 ENCOUNTER — HOSPITAL ENCOUNTER (OUTPATIENT)
Dept: CARDIAC CATH/INVASIVE PROCEDURES | Age: 49
Discharge: HOME | End: 2019-01-22
Payer: MEDICARE

## 2019-01-22 VITALS
DIASTOLIC BLOOD PRESSURE: 94 MMHG | WEIGHT: 310 LBS | BODY MASS INDEX: 37.75 KG/M2 | HEART RATE: 69 BPM | HEIGHT: 76 IN | SYSTOLIC BLOOD PRESSURE: 142 MMHG | TEMPERATURE: 98.3 F

## 2019-01-22 PROCEDURE — 36902 INTRO CATH DIALYSIS CIRCUIT: CPT | Performed by: SURGERY

## 2019-01-22 PROCEDURE — 2709999900 HC NON-CHARGEABLE SUPPLY

## 2019-01-22 PROCEDURE — 36907 BALO ANGIOP CTR DIALYSIS SEG: CPT | Performed by: SURGERY

## 2019-01-22 PROCEDURE — C1894 INTRO/SHEATH, NON-LASER: HCPCS

## 2019-01-22 PROCEDURE — C1887 CATHETER, GUIDING: HCPCS

## 2019-01-22 PROCEDURE — 2500000003 HC RX 250 WO HCPCS

## 2019-01-22 PROCEDURE — C1769 GUIDE WIRE: HCPCS

## 2019-01-22 PROCEDURE — C1725 CATH, TRANSLUMIN NON-LASER: HCPCS

## 2019-01-22 PROCEDURE — 76937 US GUIDE VASCULAR ACCESS: CPT | Performed by: SURGERY

## 2019-01-24 ENCOUNTER — HOSPITAL ENCOUNTER (OUTPATIENT)
Age: 49
Discharge: HOME | End: 2019-01-24
Payer: MEDICARE

## 2019-01-24 LAB
INR BLD: 1.7
PROTHROMBIN TIME: 19.3 SEC (ref 9.3–12.4)

## 2019-01-24 PROCEDURE — 36415 COLL VENOUS BLD VENIPUNCTURE: CPT

## 2019-01-24 PROCEDURE — 85610 PROTHROMBIN TIME: CPT

## 2019-01-29 ENCOUNTER — CARE COORDINATION (OUTPATIENT)
Dept: CARE COORDINATION | Age: 49
End: 2019-01-29

## 2019-01-31 ENCOUNTER — HOSPITAL ENCOUNTER (OUTPATIENT)
Age: 49
Discharge: HOME | End: 2019-01-31
Payer: MEDICARE

## 2019-01-31 DIAGNOSIS — I48.91 ATRIAL FIBRILLATION, UNSPECIFIED TYPE (HCC): Chronic | ICD-10-CM

## 2019-01-31 DIAGNOSIS — Z79.01 ANTICOAGULATED ON COUMADIN: ICD-10-CM

## 2019-01-31 LAB
ANION GAP SERPL CALCULATED.3IONS-SCNC: 13 MMOL/L (ref 7–16)
BUN BLDV-MCNC: 32 MG/DL (ref 6–20)
CALCIUM SERPL-MCNC: 9.1 MG/DL (ref 8.6–10.2)
CHLORIDE BLD-SCNC: 98 MMOL/L (ref 98–107)
CO2: 31 MMOL/L (ref 22–29)
CREAT SERPL-MCNC: 3.8 MG/DL (ref 0.7–1.2)
GFR AFRICAN AMERICAN: 21
GFR NON-AFRICAN AMERICAN: 17 ML/MIN/1.73
GLUCOSE BLD-MCNC: 139 MG/DL (ref 74–99)
INR BLD: 1.7
POTASSIUM SERPL-SCNC: 3.8 MMOL/L (ref 3.5–5)
PROTHROMBIN TIME: 19.2 SEC (ref 9.3–12.4)
SODIUM BLD-SCNC: 142 MMOL/L (ref 132–146)

## 2019-01-31 PROCEDURE — 36415 COLL VENOUS BLD VENIPUNCTURE: CPT

## 2019-01-31 PROCEDURE — 80048 BASIC METABOLIC PNL TOTAL CA: CPT

## 2019-01-31 PROCEDURE — 85610 PROTHROMBIN TIME: CPT

## 2019-02-04 ENCOUNTER — TELEPHONE (OUTPATIENT)
Dept: INTERNAL MEDICINE CLINIC | Age: 49
End: 2019-02-04

## 2019-02-04 PROBLEM — I82.B11 OCCLUSION OF RIGHT SUBCLAVIAN VEIN (HCC): Chronic | Status: ACTIVE | Noted: 2019-02-04

## 2019-02-04 PROBLEM — N18.6 ENCOUNTER REGARDING VASCULAR ACCESS FOR DIALYSIS FOR ESRD (HCC): Chronic | Status: ACTIVE | Noted: 2019-02-04

## 2019-02-04 PROBLEM — Z99.2 ENCOUNTER REGARDING VASCULAR ACCESS FOR DIALYSIS FOR ESRD (HCC): Chronic | Status: ACTIVE | Noted: 2019-02-04

## 2019-02-05 ENCOUNTER — HOSPITAL ENCOUNTER (OUTPATIENT)
Dept: CARDIAC CATH/INVASIVE PROCEDURES | Age: 49
Discharge: HOME | End: 2019-02-05
Payer: MEDICARE

## 2019-02-05 VITALS — BODY MASS INDEX: 37.75 KG/M2 | WEIGHT: 310 LBS | HEIGHT: 76 IN

## 2019-02-05 DIAGNOSIS — Z99.2 ENCOUNTER REGARDING VASCULAR ACCESS FOR DIALYSIS FOR ESRD (HCC): Chronic | ICD-10-CM

## 2019-02-05 DIAGNOSIS — N18.6 ENCOUNTER REGARDING VASCULAR ACCESS FOR DIALYSIS FOR ESRD (HCC): Chronic | ICD-10-CM

## 2019-02-05 DIAGNOSIS — I82.B11 OCCLUSION OF RIGHT SUBCLAVIAN VEIN (HCC): Chronic | ICD-10-CM

## 2019-02-05 DIAGNOSIS — I82.C12 INTERNAL JUGULAR VEIN THROMBOSIS, LEFT (HCC): Chronic | ICD-10-CM

## 2019-02-05 LAB
ANION GAP SERPL CALCULATED.3IONS-SCNC: 8 MMOL/L (ref 7–16)
BUN BLDV-MCNC: 35 MG/DL (ref 6–20)
CALCIUM SERPL-MCNC: 9.3 MG/DL (ref 8.6–10.2)
CHLORIDE BLD-SCNC: 102 MMOL/L (ref 98–107)
CO2: 33 MMOL/L (ref 22–29)
CREAT SERPL-MCNC: 4.2 MG/DL (ref 0.7–1.2)
GFR AFRICAN AMERICAN: 18
GFR NON-AFRICAN AMERICAN: 15 ML/MIN/1.73
GLUCOSE BLD-MCNC: 148 MG/DL (ref 74–99)
INR BLD: 1.7
POTASSIUM SERPL-SCNC: 4 MMOL/L (ref 3.5–5)
PROTHROMBIN TIME: 19.1 SEC (ref 9.3–12.4)
SODIUM BLD-SCNC: 143 MMOL/L (ref 132–146)

## 2019-02-05 PROCEDURE — 80048 BASIC METABOLIC PNL TOTAL CA: CPT

## 2019-02-05 PROCEDURE — 36415 COLL VENOUS BLD VENIPUNCTURE: CPT

## 2019-02-05 PROCEDURE — 75820 VEIN X-RAY ARM/LEG: CPT | Performed by: SURGERY

## 2019-02-05 PROCEDURE — 76499 UNLISTED DX RADIOGRAPHIC PX: CPT | Performed by: SURGERY

## 2019-02-05 PROCEDURE — 76937 US GUIDE VASCULAR ACCESS: CPT | Performed by: SURGERY

## 2019-02-05 PROCEDURE — 2709999900 HC NON-CHARGEABLE SUPPLY

## 2019-02-05 PROCEDURE — 6360000002 HC RX W HCPCS

## 2019-02-05 PROCEDURE — 36011 PLACE CATHETER IN VEIN: CPT | Performed by: SURGERY

## 2019-02-05 PROCEDURE — 36012 PLACE CATHETER IN VEIN: CPT | Performed by: SURGERY

## 2019-02-05 PROCEDURE — C1887 CATHETER, GUIDING: HCPCS

## 2019-02-05 PROCEDURE — 2500000003 HC RX 250 WO HCPCS

## 2019-02-05 PROCEDURE — 75822 VEIN X-RAY ARMS/LEGS: CPT | Performed by: SURGERY

## 2019-02-05 PROCEDURE — C1894 INTRO/SHEATH, NON-LASER: HCPCS

## 2019-02-05 PROCEDURE — 85610 PROTHROMBIN TIME: CPT

## 2019-02-05 PROCEDURE — C1769 GUIDE WIRE: HCPCS

## 2019-02-05 RX ORDER — FUROSEMIDE 40 MG/1
TABLET ORAL
Qty: 90 TABLET | Refills: 3 | Status: SHIPPED | OUTPATIENT
Start: 2019-02-05 | End: 2019-03-06 | Stop reason: SDUPTHER

## 2019-02-05 RX ORDER — SODIUM CHLORIDE 9 MG/ML
INJECTION, SOLUTION INTRAVENOUS CONTINUOUS
Status: DISCONTINUED | OUTPATIENT
Start: 2019-02-05 | End: 2019-02-06 | Stop reason: HOSPADM

## 2019-02-05 RX ORDER — ALLOPURINOL 100 MG/1
100 TABLET ORAL DAILY
COMMUNITY
End: 2019-03-06 | Stop reason: SDUPTHER

## 2019-02-07 ENCOUNTER — TELEPHONE (OUTPATIENT)
Dept: VASCULAR SURGERY | Age: 49
End: 2019-02-07

## 2019-02-08 ENCOUNTER — TELEPHONE (OUTPATIENT)
Dept: VASCULAR SURGERY | Age: 49
End: 2019-02-08

## 2019-02-11 RX ORDER — WARFARIN SODIUM 5 MG/1
TABLET ORAL
COMMUNITY
Start: 2018-12-04 | End: 2019-02-12

## 2019-02-12 ENCOUNTER — CARE COORDINATION (OUTPATIENT)
Dept: FAMILY MEDICINE CLINIC | Age: 49
End: 2019-02-12

## 2019-02-14 ENCOUNTER — ANESTHESIA (OUTPATIENT)
Dept: OPERATING ROOM | Age: 49
End: 2019-02-14
Payer: MEDICARE

## 2019-02-14 ENCOUNTER — ANESTHESIA EVENT (OUTPATIENT)
Dept: OPERATING ROOM | Age: 49
End: 2019-02-14
Payer: MEDICARE

## 2019-02-14 ENCOUNTER — HOSPITAL ENCOUNTER (OUTPATIENT)
Age: 49
Setting detail: OBSERVATION
Discharge: HOME | End: 2019-02-15
Attending: SURGERY | Admitting: SURGERY
Payer: MEDICARE

## 2019-02-14 VITALS — DIASTOLIC BLOOD PRESSURE: 83 MMHG | OXYGEN SATURATION: 96 % | SYSTOLIC BLOOD PRESSURE: 128 MMHG

## 2019-02-14 VITALS — DIASTOLIC BLOOD PRESSURE: 102 MMHG | SYSTOLIC BLOOD PRESSURE: 126 MMHG | OXYGEN SATURATION: 88 %

## 2019-02-14 DIAGNOSIS — N18.4 CKD (CHRONIC KIDNEY DISEASE) STAGE 4, GFR 15-29 ML/MIN (HCC): Chronic | ICD-10-CM

## 2019-02-14 DIAGNOSIS — N18.6 ENCOUNTER REGARDING VASCULAR ACCESS FOR DIALYSIS FOR ESRD (HCC): Primary | Chronic | ICD-10-CM

## 2019-02-14 DIAGNOSIS — Z99.2 ENCOUNTER REGARDING VASCULAR ACCESS FOR DIALYSIS FOR ESRD (HCC): Primary | Chronic | ICD-10-CM

## 2019-02-14 DIAGNOSIS — I48.91 ATRIAL FIBRILLATION, UNSPECIFIED TYPE (HCC): Chronic | ICD-10-CM

## 2019-02-14 LAB
ABO/RH: NORMAL
ANION GAP SERPL CALCULATED.3IONS-SCNC: 8 MMOL/L (ref 7–16)
ANTIBODY SCREEN: NORMAL
APTT: 31.1 SEC (ref 24.5–35.1)
BUN BLDV-MCNC: 36 MG/DL (ref 6–20)
CALCIUM SERPL-MCNC: 9.1 MG/DL (ref 8.6–10.2)
CHLORIDE BLD-SCNC: 99 MMOL/L (ref 98–107)
CO2: 32 MMOL/L (ref 22–29)
CREAT SERPL-MCNC: 3.6 MG/DL (ref 0.7–1.2)
GFR AFRICAN AMERICAN: 22
GFR NON-AFRICAN AMERICAN: 22 ML/MIN/1.73
GLUCOSE BLD-MCNC: 123 MG/DL (ref 74–99)
HCT VFR BLD CALC: 32.3 % (ref 37–54)
HEMOGLOBIN: 9.3 G/DL (ref 12.5–16.5)
INR BLD: 1.4
MCH RBC QN AUTO: 27.8 PG (ref 26–35)
MCHC RBC AUTO-ENTMCNC: 28.8 % (ref 32–34.5)
MCV RBC AUTO: 96.7 FL (ref 80–99.9)
PDW BLD-RTO: 17.9 FL (ref 11.5–15)
PLATELET # BLD: 270 E9/L (ref 130–450)
PMV BLD AUTO: 9.3 FL (ref 7–12)
POTASSIUM REFLEX MAGNESIUM: 4.4 MMOL/L (ref 3.5–5)
PROTHROMBIN TIME: 16.3 SEC (ref 9.3–12.4)
RBC # BLD: 3.34 E12/L (ref 3.8–5.8)
SODIUM BLD-SCNC: 139 MMOL/L (ref 132–146)
WBC # BLD: 5 E9/L (ref 4.5–11.5)

## 2019-02-14 PROCEDURE — 3600000012 HC SURGERY LEVEL 2 ADDTL 15MIN: Performed by: SURGERY

## 2019-02-14 PROCEDURE — 86900 BLOOD TYPING SEROLOGIC ABO: CPT

## 2019-02-14 PROCEDURE — C1768 GRAFT, VASCULAR: HCPCS | Performed by: SURGERY

## 2019-02-14 PROCEDURE — 3600000002 HC SURGERY LEVEL 2 BASE: Performed by: SURGERY

## 2019-02-14 PROCEDURE — 3700000001 HC ADD 15 MINUTES (ANESTHESIA): Performed by: SURGERY

## 2019-02-14 PROCEDURE — 2580000003 HC RX 258: Performed by: SURGERY

## 2019-02-14 PROCEDURE — 2700000000 HC OXYGEN THERAPY PER DAY

## 2019-02-14 PROCEDURE — 36415 COLL VENOUS BLD VENIPUNCTURE: CPT

## 2019-02-14 PROCEDURE — 2500000003 HC RX 250 WO HCPCS: Performed by: SURGERY

## 2019-02-14 PROCEDURE — 6360000002 HC RX W HCPCS: Performed by: NURSE ANESTHETIST, CERTIFIED REGISTERED

## 2019-02-14 PROCEDURE — 7100000000 HC PACU RECOVERY - FIRST 15 MIN: Performed by: SURGERY

## 2019-02-14 PROCEDURE — 6360000002 HC RX W HCPCS

## 2019-02-14 PROCEDURE — 6360000002 HC RX W HCPCS: Performed by: SURGERY

## 2019-02-14 PROCEDURE — 85610 PROTHROMBIN TIME: CPT

## 2019-02-14 PROCEDURE — 6370000000 HC RX 637 (ALT 250 FOR IP): Performed by: SURGERY

## 2019-02-14 PROCEDURE — G0378 HOSPITAL OBSERVATION PER HR: HCPCS

## 2019-02-14 PROCEDURE — 80048 BASIC METABOLIC PNL TOTAL CA: CPT

## 2019-02-14 PROCEDURE — 86901 BLOOD TYPING SEROLOGIC RH(D): CPT

## 2019-02-14 PROCEDURE — 2500000003 HC RX 250 WO HCPCS

## 2019-02-14 PROCEDURE — 2500000003 HC RX 250 WO HCPCS: Performed by: NURSE ANESTHETIST, CERTIFIED REGISTERED

## 2019-02-14 PROCEDURE — 86850 RBC ANTIBODY SCREEN: CPT

## 2019-02-14 PROCEDURE — 7100000001 HC PACU RECOVERY - ADDTL 15 MIN: Performed by: SURGERY

## 2019-02-14 PROCEDURE — 2709999900 HC NON-CHARGEABLE SUPPLY: Performed by: SURGERY

## 2019-02-14 PROCEDURE — 85027 COMPLETE CBC AUTOMATED: CPT

## 2019-02-14 PROCEDURE — 3700000000 HC ANESTHESIA ATTENDED CARE: Performed by: SURGERY

## 2019-02-14 PROCEDURE — 36832 AV FISTULA REVISION OPEN: CPT | Performed by: SURGERY

## 2019-02-14 PROCEDURE — 85730 THROMBOPLASTIN TIME PARTIAL: CPT

## 2019-02-14 PROCEDURE — 35860 EXPLORE LIMB VESSELS: CPT | Performed by: SURGERY

## 2019-02-14 DEVICE — GRAFT VASC L50CM DIA8MM RNG L40CM EPTFE THN WALLED CBAS HEP: Type: IMPLANTABLE DEVICE | Site: CHEST | Status: FUNCTIONAL

## 2019-02-14 RX ORDER — OXYCODONE HYDROCHLORIDE AND ACETAMINOPHEN 5; 325 MG/1; MG/1
1 TABLET ORAL EVERY 4 HOURS PRN
Status: DISCONTINUED | OUTPATIENT
Start: 2019-02-14 | End: 2019-02-15 | Stop reason: HOSPADM

## 2019-02-14 RX ORDER — ONDANSETRON 2 MG/ML
4 INJECTION INTRAMUSCULAR; INTRAVENOUS EVERY 6 HOURS PRN
Status: DISCONTINUED | OUTPATIENT
Start: 2019-02-14 | End: 2019-02-15 | Stop reason: HOSPADM

## 2019-02-14 RX ORDER — FUROSEMIDE 40 MG/1
40 TABLET ORAL 3 TIMES DAILY
Status: DISCONTINUED | OUTPATIENT
Start: 2019-02-14 | End: 2019-02-15 | Stop reason: HOSPADM

## 2019-02-14 RX ORDER — HEPARIN SODIUM 1000 [USP'U]/ML
INJECTION, SOLUTION INTRAVENOUS; SUBCUTANEOUS PRN
Status: DISCONTINUED | OUTPATIENT
Start: 2019-02-14 | End: 2019-02-14 | Stop reason: SDUPTHER

## 2019-02-14 RX ORDER — ONDANSETRON 2 MG/ML
INJECTION INTRAMUSCULAR; INTRAVENOUS PRN
Status: DISCONTINUED | OUTPATIENT
Start: 2019-02-14 | End: 2019-02-14 | Stop reason: SDUPTHER

## 2019-02-14 RX ORDER — ROCURONIUM BROMIDE 10 MG/ML
INJECTION, SOLUTION INTRAVENOUS PRN
Status: DISCONTINUED | OUTPATIENT
Start: 2019-02-14 | End: 2019-02-14 | Stop reason: SDUPTHER

## 2019-02-14 RX ORDER — OXYCODONE HYDROCHLORIDE AND ACETAMINOPHEN 5; 325 MG/1; MG/1
1 TABLET ORAL
Status: DISCONTINUED | OUTPATIENT
Start: 2019-02-14 | End: 2019-02-14 | Stop reason: HOSPADM

## 2019-02-14 RX ORDER — MIDAZOLAM HYDROCHLORIDE 1 MG/ML
INJECTION INTRAMUSCULAR; INTRAVENOUS PRN
Status: DISCONTINUED | OUTPATIENT
Start: 2019-02-14 | End: 2019-02-14 | Stop reason: SDUPTHER

## 2019-02-14 RX ORDER — METOPROLOL SUCCINATE 100 MG/1
100 TABLET, EXTENDED RELEASE ORAL 2 TIMES DAILY
Status: DISCONTINUED | OUTPATIENT
Start: 2019-02-14 | End: 2019-02-15 | Stop reason: HOSPADM

## 2019-02-14 RX ORDER — ASCORBIC ACID 500 MG
500 TABLET ORAL DAILY
Status: DISCONTINUED | OUTPATIENT
Start: 2019-02-14 | End: 2019-02-15 | Stop reason: HOSPADM

## 2019-02-14 RX ORDER — SUCCINYLCHOLINE/SOD CL,ISO/PF 100 MG/5ML
SYRINGE (ML) INTRAVENOUS PRN
Status: DISCONTINUED | OUTPATIENT
Start: 2019-02-14 | End: 2019-02-14 | Stop reason: SDUPTHER

## 2019-02-14 RX ORDER — SODIUM CHLORIDE 0.9 % (FLUSH) 0.9 %
10 SYRINGE (ML) INJECTION EVERY 12 HOURS SCHEDULED
Status: DISCONTINUED | OUTPATIENT
Start: 2019-02-14 | End: 2019-02-15 | Stop reason: HOSPADM

## 2019-02-14 RX ORDER — SODIUM CHLORIDE 0.9 % (FLUSH) 0.9 %
10 SYRINGE (ML) INJECTION EVERY 12 HOURS SCHEDULED
Status: DISCONTINUED | OUTPATIENT
Start: 2019-02-14 | End: 2019-02-14 | Stop reason: HOSPADM

## 2019-02-14 RX ORDER — ALLOPURINOL 100 MG/1
100 TABLET ORAL DAILY
Status: DISCONTINUED | OUTPATIENT
Start: 2019-02-14 | End: 2019-02-15 | Stop reason: HOSPADM

## 2019-02-14 RX ORDER — SODIUM CHLORIDE 0.9 % (FLUSH) 0.9 %
10 SYRINGE (ML) INJECTION PRN
Status: DISCONTINUED | OUTPATIENT
Start: 2019-02-14 | End: 2019-02-14 | Stop reason: HOSPADM

## 2019-02-14 RX ORDER — NEOSTIGMINE METHYLSULFATE 1 MG/ML
INJECTION, SOLUTION INTRAVENOUS PRN
Status: DISCONTINUED | OUTPATIENT
Start: 2019-02-14 | End: 2019-02-14 | Stop reason: SDUPTHER

## 2019-02-14 RX ORDER — ATORVASTATIN CALCIUM 10 MG/1
10 TABLET, FILM COATED ORAL NIGHTLY
Status: DISCONTINUED | OUTPATIENT
Start: 2019-02-14 | End: 2019-02-15 | Stop reason: HOSPADM

## 2019-02-14 RX ORDER — FENTANYL CITRATE 50 UG/ML
INJECTION, SOLUTION INTRAMUSCULAR; INTRAVENOUS PRN
Status: DISCONTINUED | OUTPATIENT
Start: 2019-02-14 | End: 2019-02-14 | Stop reason: SDUPTHER

## 2019-02-14 RX ORDER — GLYCOPYRROLATE 1 MG/5 ML
SYRINGE (ML) INTRAVENOUS PRN
Status: DISCONTINUED | OUTPATIENT
Start: 2019-02-14 | End: 2019-02-14 | Stop reason: SDUPTHER

## 2019-02-14 RX ORDER — CLONIDINE HYDROCHLORIDE 0.2 MG/1
0.2 TABLET ORAL 3 TIMES DAILY
Status: DISCONTINUED | OUTPATIENT
Start: 2019-02-14 | End: 2019-02-15 | Stop reason: HOSPADM

## 2019-02-14 RX ORDER — PROPOFOL 10 MG/ML
INJECTION, EMULSION INTRAVENOUS PRN
Status: DISCONTINUED | OUTPATIENT
Start: 2019-02-14 | End: 2019-02-14 | Stop reason: SDUPTHER

## 2019-02-14 RX ORDER — MORPHINE SULFATE 2 MG/ML
1 INJECTION, SOLUTION INTRAMUSCULAR; INTRAVENOUS EVERY 5 MIN PRN
Status: DISCONTINUED | OUTPATIENT
Start: 2019-02-14 | End: 2019-02-14 | Stop reason: HOSPADM

## 2019-02-14 RX ORDER — SODIUM CHLORIDE 9 MG/ML
INJECTION, SOLUTION INTRAVENOUS CONTINUOUS
Status: DISCONTINUED | OUTPATIENT
Start: 2019-02-14 | End: 2019-02-14

## 2019-02-14 RX ORDER — PHENYLEPHRINE HYDROCHLORIDE 10 MG/ML
INJECTION INTRAVENOUS PRN
Status: DISCONTINUED | OUTPATIENT
Start: 2019-02-14 | End: 2019-02-14 | Stop reason: SDUPTHER

## 2019-02-14 RX ORDER — ACETAMINOPHEN 325 MG/1
650 TABLET ORAL EVERY 4 HOURS PRN
Status: DISCONTINUED | OUTPATIENT
Start: 2019-02-14 | End: 2019-02-15 | Stop reason: HOSPADM

## 2019-02-14 RX ORDER — DEXAMETHASONE SODIUM PHOSPHATE 10 MG/ML
INJECTION, SOLUTION INTRAMUSCULAR; INTRAVENOUS PRN
Status: DISCONTINUED | OUTPATIENT
Start: 2019-02-14 | End: 2019-02-14 | Stop reason: SDUPTHER

## 2019-02-14 RX ORDER — MORPHINE SULFATE 2 MG/ML
2 INJECTION, SOLUTION INTRAMUSCULAR; INTRAVENOUS EVERY 5 MIN PRN
Status: DISCONTINUED | OUTPATIENT
Start: 2019-02-14 | End: 2019-02-14 | Stop reason: HOSPADM

## 2019-02-14 RX ORDER — LIDOCAINE HYDROCHLORIDE 20 MG/ML
INJECTION, SOLUTION INFILTRATION; PERINEURAL PRN
Status: DISCONTINUED | OUTPATIENT
Start: 2019-02-14 | End: 2019-02-14 | Stop reason: SDUPTHER

## 2019-02-14 RX ORDER — PROPOFOL 10 MG/ML
INJECTION, EMULSION INTRAVENOUS CONTINUOUS PRN
Status: DISCONTINUED | OUTPATIENT
Start: 2019-02-14 | End: 2019-02-14 | Stop reason: SDUPTHER

## 2019-02-14 RX ORDER — OXYCODONE HYDROCHLORIDE AND ACETAMINOPHEN 5; 325 MG/1; MG/1
1 TABLET ORAL EVERY 6 HOURS PRN
Qty: 25 TABLET | Refills: 0 | Status: SHIPPED | OUTPATIENT
Start: 2019-02-14 | End: 2019-02-28

## 2019-02-14 RX ORDER — SODIUM CHLORIDE 0.9 % (FLUSH) 0.9 %
10 SYRINGE (ML) INJECTION PRN
Status: DISCONTINUED | OUTPATIENT
Start: 2019-02-14 | End: 2019-02-15 | Stop reason: HOSPADM

## 2019-02-14 RX ADMIN — PHENYLEPHRINE HYDROCHLORIDE 100 MCG: 10 INJECTION INTRAVENOUS at 12:40

## 2019-02-14 RX ADMIN — ROCURONIUM BROMIDE 10 MG: 10 INJECTION INTRAVENOUS at 14:09

## 2019-02-14 RX ADMIN — ROCURONIUM BROMIDE 10 MG: 10 INJECTION INTRAVENOUS at 13:20

## 2019-02-14 RX ADMIN — LIDOCAINE HYDROCHLORIDE 40 MG: 20 INJECTION, SOLUTION INFILTRATION; PERINEURAL at 11:58

## 2019-02-14 RX ADMIN — FUROSEMIDE 40 MG: 40 TABLET ORAL at 20:24

## 2019-02-14 RX ADMIN — ATORVASTATIN CALCIUM 10 MG: 10 TABLET, FILM COATED ORAL at 20:24

## 2019-02-14 RX ADMIN — PHENYLEPHRINE HYDROCHLORIDE 100 MCG: 10 INJECTION INTRAVENOUS at 14:27

## 2019-02-14 RX ADMIN — MIDAZOLAM HYDROCHLORIDE 2 MG: 1 INJECTION, SOLUTION INTRAMUSCULAR; INTRAVENOUS at 11:47

## 2019-02-14 RX ADMIN — Medication 500 MG: at 20:24

## 2019-02-14 RX ADMIN — FENTANYL CITRATE 100 MCG: 50 INJECTION, SOLUTION INTRAMUSCULAR; INTRAVENOUS at 11:58

## 2019-02-14 RX ADMIN — OXYCODONE AND ACETAMINOPHEN 1 TABLET: 5; 325 TABLET ORAL at 23:31

## 2019-02-14 RX ADMIN — Medication 200 MG: at 11:58

## 2019-02-14 RX ADMIN — PROPOFOL 25 MCG/KG/MIN: 10 INJECTION, EMULSION INTRAVENOUS at 16:09

## 2019-02-14 RX ADMIN — ROCURONIUM BROMIDE 10 MG: 10 INJECTION INTRAVENOUS at 12:25

## 2019-02-14 RX ADMIN — PROPOFOL 200 MG: 10 INJECTION, EMULSION INTRAVENOUS at 11:58

## 2019-02-14 RX ADMIN — ONDANSETRON HYDROCHLORIDE 4 MG: 2 INJECTION, SOLUTION INTRAMUSCULAR; INTRAVENOUS at 14:39

## 2019-02-14 RX ADMIN — CLONIDINE HYDROCHLORIDE 0.2 MG: 0.2 TABLET ORAL at 20:24

## 2019-02-14 RX ADMIN — HYDROMORPHONE HYDROCHLORIDE 0.5 MG: 1 INJECTION, SOLUTION INTRAMUSCULAR; INTRAVENOUS; SUBCUTANEOUS at 18:56

## 2019-02-14 RX ADMIN — Medication 0.6 MG: at 14:46

## 2019-02-14 RX ADMIN — SODIUM CHLORIDE: 9 INJECTION, SOLUTION INTRAVENOUS at 11:47

## 2019-02-14 RX ADMIN — METOPROLOL SUCCINATE 100 MG: 100 TABLET, FILM COATED, EXTENDED RELEASE ORAL at 20:24

## 2019-02-14 RX ADMIN — Medication 10 ML: at 20:24

## 2019-02-14 RX ADMIN — Medication 3 MG: at 14:46

## 2019-02-14 RX ADMIN — HEPARIN SODIUM 15000 UNITS: 1000 INJECTION INTRAVENOUS; SUBCUTANEOUS at 13:38

## 2019-02-14 RX ADMIN — FENTANYL CITRATE 25 MCG: 50 INJECTION, SOLUTION INTRAMUSCULAR; INTRAVENOUS at 14:47

## 2019-02-14 RX ADMIN — ALLOPURINOL 100 MG: 100 TABLET ORAL at 20:24

## 2019-02-14 RX ADMIN — CEFAZOLIN SODIUM 3 G: 10 INJECTION, POWDER, FOR SOLUTION INTRAVENOUS at 20:21

## 2019-02-14 RX ADMIN — PHENYLEPHRINE HYDROCHLORIDE 100 MCG: 10 INJECTION INTRAVENOUS at 12:27

## 2019-02-14 RX ADMIN — DEXAMETHASONE SODIUM PHOSPHATE 10 MG: 10 INJECTION INTRAMUSCULAR; INTRAVENOUS at 11:58

## 2019-02-14 RX ADMIN — ROCURONIUM BROMIDE 10 MG: 10 INJECTION INTRAVENOUS at 11:58

## 2019-02-14 RX ADMIN — ROCURONIUM BROMIDE 30 MG: 10 INJECTION INTRAVENOUS at 12:05

## 2019-02-14 RX ADMIN — DEXTROSE MONOHYDRATE 3 G: 50 INJECTION, SOLUTION INTRAVENOUS at 11:47

## 2019-02-14 ASSESSMENT — PULMONARY FUNCTION TESTS
PIF_VALUE: 0
PIF_VALUE: 32
PIF_VALUE: 30
PIF_VALUE: 32
PIF_VALUE: 32
PIF_VALUE: 2
PIF_VALUE: 14
PIF_VALUE: 5
PIF_VALUE: 34
PIF_VALUE: 0
PIF_VALUE: 15
PIF_VALUE: 32
PIF_VALUE: 26
PIF_VALUE: 26
PIF_VALUE: 31
PIF_VALUE: 31
PIF_VALUE: 29
PIF_VALUE: 15
PIF_VALUE: 31
PIF_VALUE: 33
PIF_VALUE: 28
PIF_VALUE: 13
PIF_VALUE: 33
PIF_VALUE: 32
PIF_VALUE: 34
PIF_VALUE: 2
PIF_VALUE: 0
PIF_VALUE: 29
PIF_VALUE: 33
PIF_VALUE: 34
PIF_VALUE: 10
PIF_VALUE: 0
PIF_VALUE: 33
PIF_VALUE: 0
PIF_VALUE: 30
PIF_VALUE: 4
PIF_VALUE: 0
PIF_VALUE: 32
PIF_VALUE: 32
PIF_VALUE: 0
PIF_VALUE: 31
PIF_VALUE: 0
PIF_VALUE: 33
PIF_VALUE: 5
PIF_VALUE: 32
PIF_VALUE: 31
PIF_VALUE: 0
PIF_VALUE: 32
PIF_VALUE: 0
PIF_VALUE: 33
PIF_VALUE: 0
PIF_VALUE: 0
PIF_VALUE: 34
PIF_VALUE: 32
PIF_VALUE: 31
PIF_VALUE: 32
PIF_VALUE: 38
PIF_VALUE: 28
PIF_VALUE: 27
PIF_VALUE: 29
PIF_VALUE: 0
PIF_VALUE: 0
PIF_VALUE: 31
PIF_VALUE: 31
PIF_VALUE: 30
PIF_VALUE: 31
PIF_VALUE: 32
PIF_VALUE: 7
PIF_VALUE: 33
PIF_VALUE: 24
PIF_VALUE: 33
PIF_VALUE: 31
PIF_VALUE: 33
PIF_VALUE: 4
PIF_VALUE: 2
PIF_VALUE: 33
PIF_VALUE: 31
PIF_VALUE: 28
PIF_VALUE: 33
PIF_VALUE: 29
PIF_VALUE: 32
PIF_VALUE: 29
PIF_VALUE: 27
PIF_VALUE: 30
PIF_VALUE: 32
PIF_VALUE: 28
PIF_VALUE: 31
PIF_VALUE: 31
PIF_VALUE: 27
PIF_VALUE: 1
PIF_VALUE: 33
PIF_VALUE: 31
PIF_VALUE: 10
PIF_VALUE: 34
PIF_VALUE: 33
PIF_VALUE: 32
PIF_VALUE: 28
PIF_VALUE: 33
PIF_VALUE: 29
PIF_VALUE: 33
PIF_VALUE: 33
PIF_VALUE: 28
PIF_VALUE: 32
PIF_VALUE: 3
PIF_VALUE: 31
PIF_VALUE: 29
PIF_VALUE: 31
PIF_VALUE: 31
PIF_VALUE: 33
PIF_VALUE: 29
PIF_VALUE: 32
PIF_VALUE: 34
PIF_VALUE: 30
PIF_VALUE: 31
PIF_VALUE: 10
PIF_VALUE: 29
PIF_VALUE: 31
PIF_VALUE: 31
PIF_VALUE: 30
PIF_VALUE: 32
PIF_VALUE: 32
PIF_VALUE: 1
PIF_VALUE: 29
PIF_VALUE: 24
PIF_VALUE: 28
PIF_VALUE: 33
PIF_VALUE: 33
PIF_VALUE: 31
PIF_VALUE: 29
PIF_VALUE: 29
PIF_VALUE: 9
PIF_VALUE: 29
PIF_VALUE: 0
PIF_VALUE: 0
PIF_VALUE: 28
PIF_VALUE: 30
PIF_VALUE: 32
PIF_VALUE: 0
PIF_VALUE: 0
PIF_VALUE: 32
PIF_VALUE: 22
PIF_VALUE: 0
PIF_VALUE: 28
PIF_VALUE: 0
PIF_VALUE: 1
PIF_VALUE: 28
PIF_VALUE: 33
PIF_VALUE: 32
PIF_VALUE: 32
PIF_VALUE: 30
PIF_VALUE: 31
PIF_VALUE: 33
PIF_VALUE: 14
PIF_VALUE: 0
PIF_VALUE: 37
PIF_VALUE: 28
PIF_VALUE: 31
PIF_VALUE: 29
PIF_VALUE: 3
PIF_VALUE: 0
PIF_VALUE: 35
PIF_VALUE: 0
PIF_VALUE: 0
PIF_VALUE: 34
PIF_VALUE: 2
PIF_VALUE: 29
PIF_VALUE: 34
PIF_VALUE: 15
PIF_VALUE: 2
PIF_VALUE: 29
PIF_VALUE: 32
PIF_VALUE: 31
PIF_VALUE: 32
PIF_VALUE: 15
PIF_VALUE: 28
PIF_VALUE: 4
PIF_VALUE: 0
PIF_VALUE: 37
PIF_VALUE: 29
PIF_VALUE: 31
PIF_VALUE: 33
PIF_VALUE: 31
PIF_VALUE: 32
PIF_VALUE: 0
PIF_VALUE: 0
PIF_VALUE: 31
PIF_VALUE: 14
PIF_VALUE: 0
PIF_VALUE: 30
PIF_VALUE: 32
PIF_VALUE: 10
PIF_VALUE: 21
PIF_VALUE: 31
PIF_VALUE: 0
PIF_VALUE: 32
PIF_VALUE: 32
PIF_VALUE: 30
PIF_VALUE: 0
PIF_VALUE: 15
PIF_VALUE: 34
PIF_VALUE: 33
PIF_VALUE: 0
PIF_VALUE: 31
PIF_VALUE: 32
PIF_VALUE: 31
PIF_VALUE: 3
PIF_VALUE: 0
PIF_VALUE: 33
PIF_VALUE: 29
PIF_VALUE: 0
PIF_VALUE: 35
PIF_VALUE: 27
PIF_VALUE: 31
PIF_VALUE: 35
PIF_VALUE: 27
PIF_VALUE: 32
PIF_VALUE: 33
PIF_VALUE: 30
PIF_VALUE: 11
PIF_VALUE: 8
PIF_VALUE: 13
PIF_VALUE: 30
PIF_VALUE: 34
PIF_VALUE: 2
PIF_VALUE: 0
PIF_VALUE: 14
PIF_VALUE: 31
PIF_VALUE: 1
PIF_VALUE: 29

## 2019-02-14 ASSESSMENT — ENCOUNTER SYMPTOMS
DYSPNEA ACTIVITY LEVEL: AFTER AMBULATING 1 FLIGHT OF STAIRS
DYSPNEA ACTIVITY LEVEL: AFTER AMBULATING 1 FLIGHT OF STAIRS

## 2019-02-14 ASSESSMENT — PAIN DESCRIPTION - PROGRESSION: CLINICAL_PROGRESSION: NOT CHANGED

## 2019-02-14 ASSESSMENT — PAIN SCALES - GENERAL
PAINLEVEL_OUTOF10: 2
PAINLEVEL_OUTOF10: 8
PAINLEVEL_OUTOF10: 0
PAINLEVEL_OUTOF10: 6
PAINLEVEL_OUTOF10: 0

## 2019-02-14 ASSESSMENT — PAIN DESCRIPTION - FREQUENCY: FREQUENCY: CONTINUOUS

## 2019-02-14 ASSESSMENT — PAIN DESCRIPTION - LOCATION: LOCATION: CHEST

## 2019-02-14 ASSESSMENT — PAIN - FUNCTIONAL ASSESSMENT: PAIN_FUNCTIONAL_ASSESSMENT: 0-10

## 2019-02-14 ASSESSMENT — PAIN DESCRIPTION - DESCRIPTORS: DESCRIPTORS: ACHING;DISCOMFORT;SORE

## 2019-02-14 ASSESSMENT — PAIN DESCRIPTION - ORIENTATION: ORIENTATION: RIGHT;LEFT

## 2019-02-14 ASSESSMENT — PAIN DESCRIPTION - PAIN TYPE: TYPE: SURGICAL PAIN

## 2019-02-14 ASSESSMENT — PAIN DESCRIPTION - ONSET: ONSET: ON-GOING

## 2019-02-15 VITALS
DIASTOLIC BLOOD PRESSURE: 79 MMHG | WEIGHT: 310 LBS | TEMPERATURE: 98 F | HEIGHT: 76 IN | BODY MASS INDEX: 37.75 KG/M2 | HEART RATE: 58 BPM | SYSTOLIC BLOOD PRESSURE: 135 MMHG | RESPIRATION RATE: 18 BRPM | OXYGEN SATURATION: 97 %

## 2019-02-15 PROBLEM — G89.18 POSTOPERATIVE PAIN: Status: ACTIVE | Noted: 2019-02-15

## 2019-02-15 LAB
ANION GAP SERPL CALCULATED.3IONS-SCNC: 11 MMOL/L (ref 7–16)
BUN BLDV-MCNC: 43 MG/DL (ref 6–20)
CALCIUM SERPL-MCNC: 9.2 MG/DL (ref 8.6–10.2)
CHLORIDE BLD-SCNC: 97 MMOL/L (ref 98–107)
CO2: 30 MMOL/L (ref 22–29)
CREAT SERPL-MCNC: 4.4 MG/DL (ref 0.7–1.2)
GFR AFRICAN AMERICAN: 17
GFR NON-AFRICAN AMERICAN: 17 ML/MIN/1.73
GLUCOSE BLD-MCNC: 136 MG/DL (ref 74–99)
HCT VFR BLD CALC: 34.7 % (ref 37–54)
HEMOGLOBIN: 10 G/DL (ref 12.5–16.5)
MCH RBC QN AUTO: 28.7 PG (ref 26–35)
MCHC RBC AUTO-ENTMCNC: 28.8 % (ref 32–34.5)
MCV RBC AUTO: 99.4 FL (ref 80–99.9)
PDW BLD-RTO: 18 FL (ref 11.5–15)
PLATELET # BLD: 285 E9/L (ref 130–450)
PMV BLD AUTO: 10 FL (ref 7–12)
POTASSIUM SERPL-SCNC: 5.1 MMOL/L (ref 3.5–5)
RBC # BLD: 3.49 E12/L (ref 3.8–5.8)
SODIUM BLD-SCNC: 138 MMOL/L (ref 132–146)
WBC # BLD: 9.6 E9/L (ref 4.5–11.5)

## 2019-02-15 PROCEDURE — G0378 HOSPITAL OBSERVATION PER HR: HCPCS

## 2019-02-15 PROCEDURE — 80048 BASIC METABOLIC PNL TOTAL CA: CPT

## 2019-02-15 PROCEDURE — 36415 COLL VENOUS BLD VENIPUNCTURE: CPT

## 2019-02-15 PROCEDURE — 2580000003 HC RX 258: Performed by: SURGERY

## 2019-02-15 PROCEDURE — 96372 THER/PROPH/DIAG INJ SC/IM: CPT

## 2019-02-15 PROCEDURE — 6360000002 HC RX W HCPCS: Performed by: SURGERY

## 2019-02-15 PROCEDURE — 6370000000 HC RX 637 (ALT 250 FOR IP): Performed by: SURGERY

## 2019-02-15 PROCEDURE — 85027 COMPLETE CBC AUTOMATED: CPT

## 2019-02-15 RX ADMIN — ALLOPURINOL 100 MG: 100 TABLET ORAL at 09:16

## 2019-02-15 RX ADMIN — CEFAZOLIN SODIUM 3 G: 10 INJECTION, POWDER, FOR SOLUTION INTRAVENOUS at 02:40

## 2019-02-15 RX ADMIN — CLONIDINE HYDROCHLORIDE 0.2 MG: 0.2 TABLET ORAL at 09:16

## 2019-02-15 RX ADMIN — FUROSEMIDE 40 MG: 40 TABLET ORAL at 09:16

## 2019-02-15 RX ADMIN — OXYCODONE AND ACETAMINOPHEN 1 TABLET: 5; 325 TABLET ORAL at 03:31

## 2019-02-15 RX ADMIN — METOPROLOL SUCCINATE 100 MG: 100 TABLET, FILM COATED, EXTENDED RELEASE ORAL at 09:16

## 2019-02-15 RX ADMIN — ENOXAPARIN SODIUM 30 MG: 30 INJECTION SUBCUTANEOUS at 09:16

## 2019-02-15 RX ADMIN — Medication 500 MG: at 09:16

## 2019-02-15 ASSESSMENT — PAIN SCALES - GENERAL
PAINLEVEL_OUTOF10: 0
PAINLEVEL_OUTOF10: 5
PAINLEVEL_OUTOF10: 0

## 2019-02-19 ENCOUNTER — CARE COORDINATION (OUTPATIENT)
Dept: CARE COORDINATION | Age: 49
End: 2019-02-19

## 2019-02-21 ENCOUNTER — HOSPITAL ENCOUNTER (OUTPATIENT)
Age: 49
Discharge: HOME | End: 2019-02-21
Payer: MEDICARE

## 2019-02-21 DIAGNOSIS — Z79.01 ANTICOAGULATED ON COUMADIN: ICD-10-CM

## 2019-02-21 DIAGNOSIS — I48.91 ATRIAL FIBRILLATION, UNSPECIFIED TYPE (HCC): Chronic | ICD-10-CM

## 2019-02-21 LAB
INR BLD: 1.4
PROTHROMBIN TIME: 15.7 SEC (ref 9.3–12.4)

## 2019-02-21 PROCEDURE — 36415 COLL VENOUS BLD VENIPUNCTURE: CPT

## 2019-02-21 PROCEDURE — 85610 PROTHROMBIN TIME: CPT

## 2019-02-22 DIAGNOSIS — I82.B11 OCCLUSION OF RIGHT SUBCLAVIAN VEIN (HCC): Chronic | ICD-10-CM

## 2019-02-22 DIAGNOSIS — I48.91 ATRIAL FIBRILLATION, UNSPECIFIED TYPE (HCC): Primary | Chronic | ICD-10-CM

## 2019-02-22 RX ORDER — WARFARIN SODIUM 4 MG/1
8 TABLET ORAL DAILY
Qty: 10 TABLET | Refills: 0 | Status: SHIPPED | OUTPATIENT
Start: 2019-02-22 | End: 2019-02-28 | Stop reason: SDUPTHER

## 2019-02-25 ENCOUNTER — HOSPITAL ENCOUNTER (OUTPATIENT)
Age: 49
Discharge: HOME | End: 2019-02-25
Payer: MEDICARE

## 2019-02-25 DIAGNOSIS — I48.91 ATRIAL FIBRILLATION, UNSPECIFIED TYPE (HCC): Chronic | ICD-10-CM

## 2019-02-25 DIAGNOSIS — Z79.01 ANTICOAGULATED ON COUMADIN: ICD-10-CM

## 2019-02-25 LAB
INR BLD: 1.7
PROTHROMBIN TIME: 18.8 SEC (ref 9.3–12.4)

## 2019-02-25 PROCEDURE — 36415 COLL VENOUS BLD VENIPUNCTURE: CPT

## 2019-02-25 PROCEDURE — 85610 PROTHROMBIN TIME: CPT

## 2019-02-28 ENCOUNTER — CARE COORDINATION (OUTPATIENT)
Dept: CARE COORDINATION | Age: 49
End: 2019-02-28

## 2019-02-28 DIAGNOSIS — I48.91 ATRIAL FIBRILLATION, UNSPECIFIED TYPE (HCC): Chronic | ICD-10-CM

## 2019-02-28 DIAGNOSIS — I82.B11 OCCLUSION OF RIGHT SUBCLAVIAN VEIN (HCC): Chronic | ICD-10-CM

## 2019-02-28 RX ORDER — WARFARIN SODIUM 4 MG/1
8 TABLET ORAL DAILY
Qty: 28 TABLET | Refills: 0 | Status: SHIPPED | OUTPATIENT
Start: 2019-02-28 | End: 2019-04-24 | Stop reason: SDUPTHER

## 2019-03-04 ENCOUNTER — OFFICE VISIT (OUTPATIENT)
Dept: VASCULAR SURGERY | Age: 49
End: 2019-03-04
Payer: MEDICARE

## 2019-03-04 ENCOUNTER — HOSPITAL ENCOUNTER (OUTPATIENT)
Age: 49
Discharge: HOME | End: 2019-03-04
Payer: MEDICARE

## 2019-03-04 DIAGNOSIS — I82.B11 OCCLUSION OF RIGHT SUBCLAVIAN VEIN (HCC): Primary | ICD-10-CM

## 2019-03-04 DIAGNOSIS — N18.6 ENCOUNTER REGARDING VASCULAR ACCESS FOR DIALYSIS FOR ESRD (HCC): ICD-10-CM

## 2019-03-04 DIAGNOSIS — Z99.2 ENCOUNTER REGARDING VASCULAR ACCESS FOR DIALYSIS FOR ESRD (HCC): ICD-10-CM

## 2019-03-04 LAB
INR BLD: 1.7
PROTHROMBIN TIME: 19.4 SEC (ref 9.3–12.4)

## 2019-03-04 PROCEDURE — 85610 PROTHROMBIN TIME: CPT

## 2019-03-04 PROCEDURE — 36415 COLL VENOUS BLD VENIPUNCTURE: CPT

## 2019-03-04 PROCEDURE — 99024 POSTOP FOLLOW-UP VISIT: CPT | Performed by: SURGERY

## 2019-03-04 PROCEDURE — 93971 EXTREMITY STUDY: CPT | Performed by: SURGERY

## 2019-03-05 DIAGNOSIS — I48.19 PERSISTENT ATRIAL FIBRILLATION (HCC): Primary | Chronic | ICD-10-CM

## 2019-03-05 DIAGNOSIS — I82.C12 INTERNAL JUGULAR VEIN THROMBOSIS, LEFT (HCC): Chronic | ICD-10-CM

## 2019-03-05 RX ORDER — WARFARIN SODIUM 10 MG/1
10 TABLET ORAL DAILY
Qty: 5 TABLET | Refills: 3 | Status: SHIPPED
Start: 2019-03-05 | End: 2019-07-16 | Stop reason: ALTCHOICE

## 2019-03-06 ENCOUNTER — TELEPHONE (OUTPATIENT)
Dept: INTERNAL MEDICINE CLINIC | Age: 49
End: 2019-03-06

## 2019-03-06 RX ORDER — METOPROLOL SUCCINATE 100 MG/1
100 TABLET, EXTENDED RELEASE ORAL 2 TIMES DAILY
Qty: 30 TABLET | Refills: 3 | Status: SHIPPED | OUTPATIENT
Start: 2019-03-06 | End: 2019-05-28 | Stop reason: SDUPTHER

## 2019-03-06 RX ORDER — FUROSEMIDE 40 MG/1
TABLET ORAL
Qty: 90 TABLET | Refills: 3 | Status: SHIPPED | OUTPATIENT
Start: 2019-03-06

## 2019-03-06 RX ORDER — ALLOPURINOL 100 MG/1
100 TABLET ORAL DAILY
Qty: 30 TABLET | Refills: 3 | Status: SHIPPED | OUTPATIENT
Start: 2019-03-06

## 2019-03-14 ENCOUNTER — HOSPITAL ENCOUNTER (OUTPATIENT)
Age: 49
Discharge: HOME | End: 2019-03-14
Payer: MEDICARE

## 2019-03-14 LAB
ANION GAP SERPL CALCULATED.3IONS-SCNC: 11 MMOL/L (ref 7–16)
BUN BLDV-MCNC: 38 MG/DL (ref 6–20)
CALCIUM SERPL-MCNC: 9.2 MG/DL (ref 8.6–10.2)
CHLORIDE BLD-SCNC: 99 MMOL/L (ref 98–107)
CO2: 31 MMOL/L (ref 22–29)
CREAT SERPL-MCNC: 3.5 MG/DL (ref 0.7–1.2)
GFR AFRICAN AMERICAN: 23
GFR NON-AFRICAN AMERICAN: 23 ML/MIN/1.73
GLUCOSE BLD-MCNC: 164 MG/DL (ref 74–99)
POTASSIUM SERPL-SCNC: 3.8 MMOL/L (ref 3.5–5)
SODIUM BLD-SCNC: 141 MMOL/L (ref 132–146)

## 2019-03-14 PROCEDURE — 80048 BASIC METABOLIC PNL TOTAL CA: CPT

## 2019-03-14 PROCEDURE — 36415 COLL VENOUS BLD VENIPUNCTURE: CPT

## 2019-03-18 ENCOUNTER — CARE COORDINATION (OUTPATIENT)
Dept: CARE COORDINATION | Age: 49
End: 2019-03-18

## 2019-03-21 ENCOUNTER — CARE COORDINATION (OUTPATIENT)
Dept: CARE COORDINATION | Age: 49
End: 2019-03-21

## 2019-03-22 ENCOUNTER — HOSPITAL ENCOUNTER (OUTPATIENT)
Age: 49
Discharge: HOME | End: 2019-03-22
Payer: MEDICARE

## 2019-03-22 LAB
INR BLD: 2.1
PROTHROMBIN TIME: 24 SEC (ref 9.3–12.4)

## 2019-03-22 PROCEDURE — 85610 PROTHROMBIN TIME: CPT

## 2019-03-22 PROCEDURE — 36415 COLL VENOUS BLD VENIPUNCTURE: CPT

## 2019-03-25 ENCOUNTER — CARE COORDINATION (OUTPATIENT)
Dept: CARE COORDINATION | Age: 49
End: 2019-03-25

## 2019-04-08 ENCOUNTER — HOSPITAL ENCOUNTER (OUTPATIENT)
Age: 49
Discharge: HOME | End: 2019-04-08
Payer: MEDICARE

## 2019-04-08 DIAGNOSIS — Z99.2 ENCOUNTER REGARDING VASCULAR ACCESS FOR DIALYSIS FOR ESRD (HCC): Primary | ICD-10-CM

## 2019-04-08 DIAGNOSIS — N18.6 ENCOUNTER REGARDING VASCULAR ACCESS FOR DIALYSIS FOR ESRD (HCC): Primary | ICD-10-CM

## 2019-04-08 LAB
INR BLD: 2.8
PROTHROMBIN TIME: 31.3 SEC (ref 9.3–12.4)

## 2019-04-08 PROCEDURE — 85610 PROTHROMBIN TIME: CPT

## 2019-04-08 PROCEDURE — 36415 COLL VENOUS BLD VENIPUNCTURE: CPT

## 2019-04-09 ENCOUNTER — CARE COORDINATION (OUTPATIENT)
Dept: FAMILY MEDICINE CLINIC | Age: 49
End: 2019-04-09

## 2019-04-15 ENCOUNTER — HOSPITAL ENCOUNTER (OUTPATIENT)
Age: 49
Discharge: HOME | End: 2019-04-15
Payer: MEDICARE

## 2019-04-15 DIAGNOSIS — I48.91 ATRIAL FIBRILLATION, UNSPECIFIED TYPE (HCC): Chronic | ICD-10-CM

## 2019-04-15 DIAGNOSIS — Z79.01 ANTICOAGULATED ON COUMADIN: ICD-10-CM

## 2019-04-15 LAB
INR BLD: 2.8
PROTHROMBIN TIME: 32.1 SEC (ref 9.3–12.4)

## 2019-04-15 PROCEDURE — 85610 PROTHROMBIN TIME: CPT

## 2019-04-15 PROCEDURE — 36415 COLL VENOUS BLD VENIPUNCTURE: CPT

## 2019-04-16 ENCOUNTER — CARE COORDINATION (OUTPATIENT)
Dept: CARE COORDINATION | Age: 49
End: 2019-04-16

## 2019-04-16 ENCOUNTER — OFFICE VISIT (OUTPATIENT)
Dept: INTERNAL MEDICINE CLINIC | Age: 49
End: 2019-04-16
Payer: MEDICARE

## 2019-04-16 VITALS
WEIGHT: 315 LBS | HEIGHT: 72 IN | OXYGEN SATURATION: 91 % | TEMPERATURE: 98.6 F | SYSTOLIC BLOOD PRESSURE: 133 MMHG | DIASTOLIC BLOOD PRESSURE: 71 MMHG | HEART RATE: 81 BPM | BODY MASS INDEX: 42.66 KG/M2

## 2019-04-16 DIAGNOSIS — F41.9 ANXIETY: Chronic | ICD-10-CM

## 2019-04-16 DIAGNOSIS — I10 ESSENTIAL HYPERTENSION: ICD-10-CM

## 2019-04-16 DIAGNOSIS — N18.4 CKD (CHRONIC KIDNEY DISEASE) STAGE 4, GFR 15-29 ML/MIN (HCC): Primary | Chronic | ICD-10-CM

## 2019-04-16 PROCEDURE — G8417 CALC BMI ABV UP PARAM F/U: HCPCS | Performed by: INTERNAL MEDICINE

## 2019-04-16 PROCEDURE — 99213 OFFICE O/P EST LOW 20 MIN: CPT | Performed by: INTERNAL MEDICINE

## 2019-04-16 PROCEDURE — 1036F TOBACCO NON-USER: CPT | Performed by: INTERNAL MEDICINE

## 2019-04-16 PROCEDURE — G8427 DOCREV CUR MEDS BY ELIG CLIN: HCPCS | Performed by: INTERNAL MEDICINE

## 2019-04-16 PROCEDURE — 3017F COLORECTAL CA SCREEN DOC REV: CPT | Performed by: INTERNAL MEDICINE

## 2019-04-16 NOTE — PROGRESS NOTES
[facility]  -Resident Progress Note-     Jenelle Tate / : 1970 / MRN [id number] / Shauna Ba 2019    Reason for visit:    Chief Complaint   Patient presents with    Follow-up     No new issues    Health Maintenance     Due for hep B, PCV13, Tdap fit test and shingles     Date of last visit:  2018    Today's Visit  Jenelle Tate is a 47 y.o. male who [facility] for hospital follow up. Patient has had a difficult last couple months with multiple deaths of people close to him. He lost his nephrologist Dr. Ivania Murray and has having a difficult time with not having a set appointment with his next nephrologist that has not yet been assigned. His INR has been stable for the last two measurements, and lab work will be checked again next week. From last visit:  Patient was recently discharged from [address] [place] [address] after an extensive hospitalization spanning from  through 12/3. He was initially admitted to [address] [place] [address] and transferred to [address] [place] [address] due to his vascular surgeon only having privileges there. He was found to have a R IJV thrombosis in addition to a totally occluded R subclavian. He was started on blood thinners. A VATS procedure was attempted to evaluate a suspicious lymph node and complications of the procedure lead to the patient being intubated and on a ventilator in the CCU for several days. While on the ventilator temporary dialysis was initiated. His hospitalization was also complicated by bacteremia, which was treated with IV antibiotics, all lines removed, and COSME done. Patient was discharged to home with his wife being his primary care taker. They have no [facility] nursing or physical therapy needs at this time. Patient has been doing well since discharge. He has been up and moving around. This is the first of several follow-up appointments he will need with a variety of sub specialists.  He is expressing frustration with his vascular surgeon as he has had multiple complications from any procedure done. He would like a referral for a second opinion.          Past Medical History:   Diagnosis Date    Arthritis     Blood circulation, collateral     Cardiomegaly     CKD (chronic kidney disease) stage 4, GFR 15-29 ml/min ([facility])     Depression     Diastolic CHF ([facility])     EF 55%    Encounter regarding vascular access for dialysis for ESRD ([facility] 2/4/2019    History of echocardiogram 4/28/16    EF 43% stage II diastolic    History of echocardiogram 05/31/2016    EF 45%    History of echocardiogram 03/15/2017    EF 40-45%    Hyperlipidemia     Hypertension     Internal jugular vein thrombosis, left ([facility] [address] 2/5/2019    Knee dislocation     left knee dislocated patient rehab self and reinjured    Obesity (BMI 30-39.9)     bmi 37.8    Occlusion of right subclavian vein ([facility] 2/4/2019    PILAR (obstructive sleep apnea) 2019    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     Postoperative pain 2/15/2019    Shoulder disorder     right shoulder degenerative arthritis       Review of Systems  All bolded are positive; please see HPI  General:  Fever, chills, diaphoresis, fatigue, malaise, night sweats, weight loss  Psychological:  Anxiety, disorientation, hallucinations  ENT:  Epistaxis, headaches, vertigo, visual changes, periorbital swelling  Cardiovascular:  Chest pain, irregular heartbeats, palpitations, paroxysmal nocturnal dyspnea  Respiratory:  Shortness of breath, coughing, sputum production, hemoptysis, wheezing, orthopnea  Gastrointestinal:  Nausea, vomiting, diarrhea, heartburn, constipation, abdominal pain, hematemesis, hematochezia, melena, acholic stools  Genito-Urinary:  Dysuria, urgency, frequency, hematuria  Musculoskeletal:  Joint pain, joint stiffness, joint swelling, muscle pain  Neurology:  Headache, focal neurological deficits, weakness, numbness, paresthesia  Derm:  Rashes, ulcers, excoriations, Future    Anxiety    Essential hypertension  -     CBC; Future  -     COMPREHENSIVE METABOLIC PANEL; Future    INR to be checked next week along with CBC and CMP. Patient to call nephrology office for his follow up with a new physician   Diet and fluid intake discussed, and patient to start monitoring his fluid intake. CHF information packet given. Greater than 50% of the time during this encounter was spent face-to-face with the patient, counseling them regarding their medical conditions as well as reviewing all pertinent laboratory findings and imaging studies. The patient was discussed with Dr. Herrera Adlridge today.     Dina Trejo DO, PGY2  4/16/2019  3:07 PM

## 2019-04-16 NOTE — PROGRESS NOTES
I was present when the patient was in the clinic. I have discussed the case, including pertinent history and exam findings with the resident. I agree with the assessment, plan and orders as documented by the resident.     [address]  4/16/2019

## 2019-04-16 NOTE — CARE COORDINATION
increase my activity by Advised to engage in walking 15-20 minutes 3 times a week, with pulse rate range - to - and frequency goal is  3-4 times a week    Barriers: lack of motivation  Plan for overcoming my barriers: encouragement by wife and set goal  Confidence: /10  Anticipated Goal Completion Date: by next [facility] f/u    4/16/2019- patient does not want to continue this goal as wants to start with condition and appts       Conditions and Symptoms        I will schedule office visits, as directed by my provider. I will keep my appointment or reschedule if I have to cancel. I will notify my provider of any barriers to my plan of care. I will follow my Zone Management tool to seek urgent or emergent care. I will notify my provider of any symptoms that indicate a worsening of my condition. Make appt with nephrologist  Keep appt for PT/INR  Keep appt with PCP  Barriers: overwhelmed by complexity of regimen, stress and lack of education  Plan for overcoming my barriers: work with care coordinator and read CHF information given to him  Confidence: 5/10  Anticipated Goal Completion Date: 6/1/2019 4/16/2019- information given to patient and will review at next call. Prior to Admission medications    Medication Sig Start Date End Date Taking? Authorizing Provider   furosemide (LASIX) 40 MG tablet TAKE 1 TABLET BY MOUTH THREE TIMES DAILY 3/6/19   Almita Sports, DO   metoprolol succinate (TOPROL XL) 100 MG extended release tablet Take 1 tablet by mouth 2 times daily 3/6/19   Almita Sports, DO   allopurinol (ZYLOPRIM) 100 MG tablet Take 1 tablet by mouth daily 3/6/19   Almita Sports, DO   warfarin (COUMADIN) 10 MG tablet Take 1 tablet by mouth daily Take 10mg daily for 5 days and then resume 8mg daily.  Have INR checked after 3 doses of 10 mg. 3/5/19   Almita Sports, DO   warfarin (COUMADIN) 4 MG tablet Take 2 tablets by mouth daily for 14 days Have INR rechecked in 3-4 days and contact PCP before resuming as dose adjustment may be needed.  2/28/19 4/16/19  Chidi Cintron DO   atorvastatin (LIPITOR) 10 MG tablet TAKE ONE TABLET BY MOUTH ONCE DAILY 1/4/19   Chidi Cintron DO   cloNIDine (CATAPRES) 0.2 MG tablet TAKE 1 TABLET BY MOUTH THREE TIMES DAILY  Patient taking differently: Take 0.2 mg by mouth 3 times daily Take morning of surgery with a sip of water 12/20/18   Chidi Cintron DO   melatonin 3 MG TABS tablet Take 1 tablet by mouth daily  Patient taking differently: Take 3 mg by mouth daily STOP PREOP MED 12/19/17   Alexander Gary DO   Cholecalciferol (VITAMIN D3) 5000 UNITS CAPS Take 1 capsule by mouth daily  Patient taking differently: Take 1 capsule by mouth daily STOP PREOP MED 4/21/17   Victor Manuel Alford DO   Ascorbic Acid (VITAMIN C) 500 MG tablet Take 500 mg by mouth daily STOP PREOP MED    Historical Provider, MD       Future Appointments   Date Time Provider Latosha Abdul   4/29/2019  8:30 AM [facility] US RM 1 SEYZ CARDIO None   4/29/2019  9:00 AM kC Grimm MD VAS[facility]   5/14/2019  2:00 PM Chidi Cintron DO [facility]

## 2019-04-17 NOTE — TELEPHONE ENCOUNTER
I did not give him a limit at this visit, just told him to start being aware of how much fluid he drinks daily. If he would like to start keeping track, please advise him to consume no more than 2L of fluid daily.      Thanks

## 2019-04-18 NOTE — CARE COORDINATION
Patient called for follow up to CHF information given to patient at 4/16 appt  - number under patient's name has the voice message of Lin Felder and Guanako Willingham. No message left  - message left on wife's phone reviewing that PCP would like patient to have no mor than 2L daily and to have patient weight himself reviewing on how to do daily. Let her know sent information about CHF hoe with him and that we are working on preventing and recognizing any fluid overload.  Any questions to call

## 2019-04-22 DIAGNOSIS — I48.91 ATRIAL FIBRILLATION, UNSPECIFIED TYPE (HCC): Chronic | ICD-10-CM

## 2019-04-22 DIAGNOSIS — I82.B11 OCCLUSION OF RIGHT SUBCLAVIAN VEIN (HCC): Chronic | ICD-10-CM

## 2019-04-24 RX ORDER — WARFARIN SODIUM 4 MG/1
8 TABLET ORAL DAILY
Qty: 28 TABLET | Refills: 0 | Status: SHIPPED | OUTPATIENT
Start: 2019-04-24 | End: 2019-05-07 | Stop reason: SDUPTHER

## 2019-04-29 ENCOUNTER — OFFICE VISIT (OUTPATIENT)
Dept: VASCULAR SURGERY | Age: 49
End: 2019-04-29

## 2019-04-29 DIAGNOSIS — N18.6 ENCOUNTER REGARDING VASCULAR ACCESS FOR DIALYSIS FOR ESRD (HCC): Primary | ICD-10-CM

## 2019-04-29 DIAGNOSIS — Z99.2 ENCOUNTER REGARDING VASCULAR ACCESS FOR DIALYSIS FOR ESRD (HCC): Primary | ICD-10-CM

## 2019-04-29 PROCEDURE — 99024 POSTOP FOLLOW-UP VISIT: CPT | Performed by: SURGERY

## 2019-04-29 NOTE — PROGRESS NOTES
4/29/2019    Jai Job  1970    PCP : Neema Nichole DO   Nephrologist: Dr. Sunitha Tracy     Previous Procedures by Dr. Laney Maloney  6/28/13 Creation R RC AVF   2/28/14 Creation R BC AVF     Previous Procedures at [facility] with Dr. Brittany Villa  11/14/18  Central venogram from the right femoral and right upper extremity   Unable to cross the right subclavian vein occlusion    11/19/18  Right chest tube for right tension pneumothorax    11/26/18  Left IJ tunneled dialysis catheter insertion      1/22/19 R UE fistulagram, plasty med cephalic and subclavian vein   2/5/19  R UE venogram    2/14/19  Revision R UE AVF   Insertion right to left axillary-axillary vein bypass 8mm ptfe  R axillary incision exploration, evaculation of hematoma      Patient returns for post operative evaluation. The patient denies any unexpected problems since his last visit. He remains off dialysis. The wound is healing well. Denies right hand pain. PE  Gen NAD Awake and Alert  Right Upper Extremity  · Fistula has palpable thrill, and a bruit is auscultated  · Slightly pulsatile at anastomosis, but improved since last visit  · Palpable thrill in graft  · Brachial 2 Radial biphasic  · Wound is clean, dry and intact with no evidence of cellulitis or drainage  · No deficits in sensation or motor    A/P Dialysis Access  Right BC AVF  Right to left axillary-axillary vein bypass   · wound is  healing well  · no evidence of steal syndrome  · av access is not yet ready for use  · Would like to obtain fistula duplex to evaluate pulsatility  · I reviewed with the patient that normal activities can be resumed as tolerated  · F/u based on results of fistula duplex     Pt seen and plan reviewed with Dr. Margarita Mcnamara.      Jimenez Bowling, CNP

## 2019-05-01 ENCOUNTER — CARE COORDINATION (OUTPATIENT)
Dept: CARE COORDINATION | Age: 49
End: 2019-05-01

## 2019-05-01 NOTE — LETTER
[facility]  No information on file. Sterling Blanton RN        May 3, 2019    [address] [place] [id number]      Dear Michael Craig:    I have been unable to reach you by phone for our care coordination follow up telephone call. I have missed our conversations and am interested in your health. Please call me at [phone], so that we can continue to work together to improve your health. I have included the blood work slips that you need to get done as soon as possible as Dr Saúl Roque wanted these done last week. Please remember that they are fasting. Do appreciate and I will see you at your appointment on 5/14/2019    If you have any questions or concerns, please don't hesitate to call.     Sincerely,        Sterling Blanton RN

## 2019-05-01 NOTE — CARE COORDINATION
Patient called for follow up CHF, weighing self and need for blood work  - msg left on [facility]

## 2019-05-03 ENCOUNTER — HOSPITAL ENCOUNTER (OUTPATIENT)
Age: 49
Discharge: HOME | End: 2019-05-03
Payer: MEDICARE

## 2019-05-03 DIAGNOSIS — I48.91 ATRIAL FIBRILLATION, UNSPECIFIED TYPE (HCC): Chronic | ICD-10-CM

## 2019-05-03 DIAGNOSIS — I10 ESSENTIAL HYPERTENSION: ICD-10-CM

## 2019-05-03 DIAGNOSIS — N18.4 CKD (CHRONIC KIDNEY DISEASE) STAGE 4, GFR 15-29 ML/MIN (HCC): Chronic | ICD-10-CM

## 2019-05-03 DIAGNOSIS — Z79.01 ANTICOAGULATED ON COUMADIN: ICD-10-CM

## 2019-05-06 DIAGNOSIS — I48.91 ATRIAL FIBRILLATION, UNSPECIFIED TYPE (HCC): Chronic | ICD-10-CM

## 2019-05-06 DIAGNOSIS — I82.B11 OCCLUSION OF RIGHT SUBCLAVIAN VEIN (HCC): Chronic | ICD-10-CM

## 2019-05-07 ENCOUNTER — CARE COORDINATION (OUTPATIENT)
Dept: CARE COORDINATION | Age: 49
End: 2019-05-07

## 2019-05-07 ENCOUNTER — HOSPITAL ENCOUNTER (OUTPATIENT)
Age: 49
Discharge: HOME | End: 2019-05-07
Payer: MEDICARE

## 2019-05-07 DIAGNOSIS — I10 ESSENTIAL HYPERTENSION: ICD-10-CM

## 2019-05-07 DIAGNOSIS — N18.4 CKD (CHRONIC KIDNEY DISEASE) STAGE 4, GFR 15-29 ML/MIN (HCC): Chronic | ICD-10-CM

## 2019-05-07 LAB
ALBUMIN SERPL-MCNC: 3.5 G/DL (ref 3.5–5.2)
ALP BLD-CCNC: 68 U/L (ref 40–129)
ALT SERPL-CCNC: 5 U/L (ref 0–40)
ANION GAP SERPL CALCULATED.3IONS-SCNC: 10 MMOL/L (ref 7–16)
AST SERPL-CCNC: 8 U/L (ref 0–39)
BILIRUB SERPL-MCNC: 3.5 MG/DL (ref 0–1.2)
BUN BLDV-MCNC: 42 MG/DL (ref 6–20)
CALCIUM SERPL-MCNC: 9 MG/DL (ref 8.6–10.2)
CHLORIDE BLD-SCNC: 102 MMOL/L (ref 98–107)
CO2: 31 MMOL/L (ref 22–29)
CREAT SERPL-MCNC: 4.5 MG/DL (ref 0.7–1.2)
GFR AFRICAN AMERICAN: 17
GFR NON-AFRICAN AMERICAN: 17 ML/MIN/1.73
GLUCOSE BLD-MCNC: 112 MG/DL (ref 74–99)
HCT VFR BLD CALC: 31.6 % (ref 37–54)
HEMOGLOBIN: 8.9 G/DL (ref 12.5–16.5)
INR BLD: 2.5
MCH RBC QN AUTO: 27.1 PG (ref 26–35)
MCHC RBC AUTO-ENTMCNC: 28.2 % (ref 32–34.5)
MCV RBC AUTO: 96 FL (ref 80–99.9)
PDW BLD-RTO: 21 FL (ref 11.5–15)
PLATELET # BLD: 189 E9/L (ref 130–450)
PMV BLD AUTO: 8.7 FL (ref 7–12)
POTASSIUM SERPL-SCNC: 4.4 MMOL/L (ref 3.5–5)
PROTHROMBIN TIME: 27.5 SEC (ref 9.3–12.4)
RBC # BLD: 3.29 E12/L (ref 3.8–5.8)
SODIUM BLD-SCNC: 143 MMOL/L (ref 132–146)
TOTAL PROTEIN: 7.1 G/DL (ref 6.4–8.3)
WBC # BLD: 4.1 E9/L (ref 4.5–11.5)

## 2019-05-07 PROCEDURE — 80053 COMPREHEN METABOLIC PANEL: CPT

## 2019-05-07 PROCEDURE — 36415 COLL VENOUS BLD VENIPUNCTURE: CPT

## 2019-05-07 PROCEDURE — 85610 PROTHROMBIN TIME: CPT

## 2019-05-07 PROCEDURE — 85027 COMPLETE CBC AUTOMATED: CPT

## 2019-05-07 RX ORDER — WARFARIN SODIUM 4 MG/1
8 TABLET ORAL DAILY
Qty: 28 TABLET | Refills: 0 | Status: SHIPPED | OUTPATIENT
Start: 2019-05-07 | End: 2019-05-20 | Stop reason: SDUPTHER

## 2019-05-07 RX ORDER — ATORVASTATIN CALCIUM 10 MG/1
TABLET, FILM COATED ORAL
Qty: 30 TABLET | Refills: 3 | Status: SHIPPED | OUTPATIENT
Start: 2019-05-07 | End: 2019-09-03 | Stop reason: SDUPTHER

## 2019-05-07 NOTE — CARE COORDINATION
Called and spoke with the outpatient lab who said that patient's lab work was cancelled and patient was asked to come back. Asked the reasoning and Devendra Osorio was no sure and may be due to the number of patients that needed done and patient did not want to wait    - called patient and left msg that blood work needs to be done and that if he had the blood work slips togo and use them again. If he did not to call care coordinator and will get for him. Contact information given    - called wife and left same message and date and time of his appt next week.

## 2019-05-14 ENCOUNTER — CARE COORDINATION (OUTPATIENT)
Dept: CARE COORDINATION | Age: 49
End: 2019-05-14

## 2019-05-14 NOTE — TELEPHONE ENCOUNTER
His INR has been stable for the last several checks, so no need to get one again before his next visit. I would agree with the small sips of water, if he is unable to take his medications and symptoms persist for >24 hrs he should go to the ED. He should keep the appointment with Dr. Franklin Cesar and reschedule with me as soon as he is able. Thanks!

## 2019-05-14 NOTE — CARE COORDINATION
Patient called for missing his appointment today  - states that since getting up this am has had nausea, vomiting and diarrhea that he cannot leave far from the bathroom  - states that did eat out last night, has had nothing to eat or drink today. - no one in the house is sick  - encouraged fluids once the vomiting stops, nausea starts to ease is small frequent sips. Advised patient to seek treatment if this lasts more than 2 days. He said that he will. Let him know that if PCP has any other instructions for him that I will call and follow up. - let patient know of the new care coordinator that will be covering the office and she will be reaching out to him. Let him now will mail to him a letter with her information  - says that has an appointment with Dr Akila Fulton vascular surgeon tomorrow that he is planning to cancel but encouraged him to keep his appointment with the new nephrologist next week. He said that is planning to keep.

## 2019-05-14 NOTE — LETTER
[address]    Linh Mendoza DO  5/14/2019    [address] [address]      [place]     It has been my pleasure to assist you in your health care, but I have been assigned to another position and will be transitioning your care to another RN Care Coordinator at [facility]. I will miss working with you, but I am leaving you in good hands. I will be transitioning your care to Levi Messina RN, Care Coordinator, who will continue to work in conjunction with Linh Mendoza DO to support you in reaching your health care goals. She will continue to work with you towards your plan of care. You can contact her by phone at [phone]. If you experience any concerns or questions related to your plan of care or condition, you can contact your new RN Care Coordinator or [facility] at [phone]. Please continue to call your doctor's office for appointments and medication refills and after office hours or on the weekend with any urgent issues. The staff will direct you on appropriate actions. Again, thank you for allowing me to participate in your care.     In good health,     Zayda Heart RN

## 2019-05-15 NOTE — CARE COORDINATION
Patient left message that PCP does agree with the small sips of water, that is he is unable to take his medication or symptoms persist more than 24hours to go to ED, keep appt with Dr Lino See the nephrologist and make an appointment with her as soon as he can.  Any questions or concerns to call back

## 2019-05-20 DIAGNOSIS — I48.91 ATRIAL FIBRILLATION, UNSPECIFIED TYPE (HCC): Chronic | ICD-10-CM

## 2019-05-20 DIAGNOSIS — I82.B11 OCCLUSION OF RIGHT SUBCLAVIAN VEIN (HCC): Chronic | ICD-10-CM

## 2019-05-20 RX ORDER — WARFARIN SODIUM 4 MG/1
8 TABLET ORAL DAILY
Qty: 28 TABLET | Refills: 0 | Status: SHIPPED | OUTPATIENT
Start: 2019-05-20 | End: 2019-06-10 | Stop reason: SDUPTHER

## 2019-05-20 RX ORDER — CLONIDINE HYDROCHLORIDE 0.2 MG/1
TABLET ORAL
Qty: 90 TABLET | Refills: 3 | Status: SHIPPED | OUTPATIENT
Start: 2019-05-20

## 2019-05-28 ENCOUNTER — CARE COORDINATION (OUTPATIENT)
Dept: CARE COORDINATION | Age: 49
End: 2019-05-28

## 2019-05-28 RX ORDER — METOPROLOL SUCCINATE 100 MG/1
100 TABLET, EXTENDED RELEASE ORAL 2 TIMES DAILY
Qty: 30 TABLET | Refills: 3 | Status: SHIPPED | OUTPATIENT
Start: 2019-05-28 | End: 2019-08-19 | Stop reason: SDUPTHER

## 2019-05-28 NOTE — CARE COORDINATION
Ambulatory Care Coordination Note  5/28/2019  CM Risk Score: 10  [place] Mortality Risk Score:      ACC: Chrissie Lindsay RN    Summary Note: Called pt to follow up on progress, reinforce previous/ provide pt education, and discuss any new issues or concerns. LVMM requesting a call back to discuss. Care Coordination Interventions    Program Enrollment:  Complex Care  Referral from Primary Care Provider:  Yes  Suggested Interventions and Community Resources  Medication Assistance Program:  Completed (Comment: referral to PAP 4/16/2019 states that medications are affordable)  Medi Set or Pill Pack:  Completed (Comment: 4/16/2019 wfe fills pill organizer)  Zone Management Tools:   (Comment: 4/16/2019- patient given CHF Zones)         Goals Addressed     None          Prior to Admission medications    Medication Sig Start Date End Date Taking? Authorizing Provider   warfarin (COUMADIN) 4 MG tablet Take 2 tablets by mouth daily for 14 days Have INR rechecked in 3-4 days and contact PCP before resuming as dose adjustment may be needed. 5/20/19 6/3/19  Estephania Clap, DO   cloNIDine (CATAPRES) 0.2 MG tablet TAKE 1 TABLET BY MOUTH THREE TIMES DAILY 5/20/19   Estephania Clap, DO   atorvastatin (LIPITOR) 10 MG tablet TAKE ONE TABLET BY MOUTH ONCE DAILY 5/7/19   [place] Clap, DO   furosemide (LASIX) 40 MG tablet TAKE 1 TABLET BY MOUTH THREE TIMES DAILY 3/6/19   Estephania Clap, DO   metoprolol succinate (TOPROL XL) 100 MG extended release tablet Take 1 tablet by mouth 2 times daily 3/6/19   [place] Clap, DO   allopurinol (ZYLOPRIM) 100 MG tablet Take 1 tablet by mouth daily 3/6/19   Estephania Clap, DO   warfarin (COUMADIN) 10 MG tablet Take 1 tablet by mouth daily Take 10mg daily for 5 days and then resume 8mg daily.  Have INR checked after 3 doses of 10 mg. 3/5/19   [place] Clap, DO   melatonin 3 MG TABS tablet Take 1 tablet by mouth daily  Patient taking differently: Take 3 mg by mouth daily STOP PREOP MED 12/19/17   Devi Palm DO   Cholecalciferol (VITAMIN D3) 5000 UNITS CAPS Take 1 capsule by mouth daily  Patient taking differently: Take 1 capsule by mouth daily STOP PREOP MED 4/21/17   Victor Manuel Alford DO   Ascorbic Acid (VITAMIN C) 500 MG tablet Take 500 mg by mouth daily STOP PREOP MED    Historical Provider, MD       Future Appointments   Date Time Provider Latosha Abdul   6/3/2019 10:45 AM [facility] VAS US RM 1 SEYZ CARDIO None

## 2019-06-03 ENCOUNTER — HOSPITAL ENCOUNTER (OUTPATIENT)
Dept: CARDIOLOGY | Age: 49
Discharge: HOME | End: 2019-06-03
Payer: MEDICARE

## 2019-06-03 DIAGNOSIS — N18.6 ENCOUNTER REGARDING VASCULAR ACCESS FOR DIALYSIS FOR ESRD (HCC): ICD-10-CM

## 2019-06-03 DIAGNOSIS — Z99.2 ENCOUNTER REGARDING VASCULAR ACCESS FOR DIALYSIS FOR ESRD (HCC): ICD-10-CM

## 2019-06-03 PROCEDURE — 93990 DOPPLER FLOW TESTING: CPT

## 2019-06-04 NOTE — PROGRESS NOTES
[facility] - [facility]    This is a pre-read worksheet    Sami Bryant  1970  Date of study: 6/4/19    Indication for study:  Chronic renal failure  Study : Fistula Duplex    AX- AX bypass w/ right avf   Diameter (mm) Depth (mm) Peak Systolic Velocities (cm/s)   Proximal 210 92 398   Mid Proximal 110 110 207   Mid Distal 180 30 469           Additional comments:RIGHT TO LEFT AX- AX BYPASS    RIGHT  187 psv    MID  158 psv    MID  203 psv    LEFT  305 psv    No major stenosis visualized.  Right arm AVF tortuous

## 2019-06-07 ENCOUNTER — CARE COORDINATION (OUTPATIENT)
Dept: CARE COORDINATION | Age: 49
End: 2019-06-07

## 2019-06-07 NOTE — CARE COORDINATION
daily  Patient taking differently: Take 1 capsule by mouth daily STOP PREOP MED 4/21/17   Antiem T Alford, DO   Ascorbic Acid (VITAMIN C) 500 MG tablet Take 500 mg by mouth daily STOP PREOP MED    Historical Provider, MD       No future appointments.    and   Congestive Heart Failure Assessment    Are you currently restricting fluids?:  Other (Comment: restricting fluid intake but does not know the amount 4/16/2019-  fluid restriction given by PCP)  Do you understand a low sodium diet?:  Yes  Do you understand how to read food labels?:  Yes  How many restaurant meals do you eat per week?:  1-2  Do you salt your food before tasting it?:  No     No patient-reported symptoms      Symptoms:   None:  Yes      Symptom course:  stable  Patient-reported weight (lb):  349 (Comment: per pt )  Weight trend:  stable (Comment: Per pt)  Salt intake watch compared to last visit:  stable

## 2019-06-10 ENCOUNTER — HOSPITAL ENCOUNTER (OUTPATIENT)
Age: 49
Discharge: HOME | End: 2019-06-10
Payer: MEDICARE

## 2019-06-10 DIAGNOSIS — I82.B11 OCCLUSION OF RIGHT SUBCLAVIAN VEIN (HCC): Chronic | ICD-10-CM

## 2019-06-10 DIAGNOSIS — I48.91 ATRIAL FIBRILLATION, UNSPECIFIED TYPE (HCC): Chronic | ICD-10-CM

## 2019-06-10 DIAGNOSIS — Z79.01 ANTICOAGULATED ON COUMADIN: ICD-10-CM

## 2019-06-10 LAB
INR BLD: 3.9
PROTHROMBIN TIME: 44.4 SEC (ref 9.3–12.4)

## 2019-06-10 PROCEDURE — 36415 COLL VENOUS BLD VENIPUNCTURE: CPT

## 2019-06-10 PROCEDURE — 85610 PROTHROMBIN TIME: CPT

## 2019-06-10 RX ORDER — WARFARIN SODIUM 4 MG/1
8 TABLET ORAL DAILY
Qty: 28 TABLET | Refills: 0 | Status: SHIPPED | OUTPATIENT
Start: 2019-06-10 | End: 2019-06-18 | Stop reason: SDUPTHER

## 2019-06-18 DIAGNOSIS — I48.91 ATRIAL FIBRILLATION, UNSPECIFIED TYPE (HCC): Chronic | ICD-10-CM

## 2019-06-18 DIAGNOSIS — I82.B11 OCCLUSION OF RIGHT SUBCLAVIAN VEIN (HCC): Chronic | ICD-10-CM

## 2019-06-18 RX ORDER — WARFARIN SODIUM 4 MG/1
8 TABLET ORAL DAILY
Qty: 60 TABLET | Refills: 3 | Status: SHIPPED | OUTPATIENT
Start: 2019-06-18 | End: 2019-08-19

## 2019-06-21 ENCOUNTER — CARE COORDINATION (OUTPATIENT)
Dept: CARE COORDINATION | Age: 49
End: 2019-06-21

## 2019-06-21 NOTE — CARE COORDINATION
Symptoms:   None:  Yes      Symptom course:  stable  Patient-reported weight (lb):  348 (Comment: per pt)  Weight trend:  stable (Comment: per pt)  Salt intake watch compared to last visit:  stable

## 2019-07-02 ENCOUNTER — CARE COORDINATION (OUTPATIENT)
Dept: CARE COORDINATION | Age: 49
End: 2019-07-02

## 2019-07-02 NOTE — CARE COORDINATION
Ambulatory Care Coordination Note  7/2/2019  CM Risk Score: 10  Charlson 10 Year Mortality Risk Score: 47%     ACC: Erin Khoury RN    Summary Note: Called pt to follow up on progress, reinforce previous/ provide pt education, and discuss any new issues or concerns. LVMM requesting a call back to discuss. Care Coordination Interventions    Program Enrollment:  Complex Care  Referral from Primary Care Provider:  Yes  Suggested Interventions and Community Resources  Medication Assistance Program:  Completed (Comment: referral to PAP 4/16/2019 states that medications are affordable)  Medi Set or Pill Pack:  Completed (Comment: 4/16/2019 wfe fills pill organizer)  Specialty Services Referral:  Completed (Comment: Referral- Nephrology)  Zone Management Tools:  Completed (Comment: 4/16/2019- patient given CHF Zones)         Goals Addressed     None          Prior to Admission medications    Medication Sig Start Date End Date Taking? Authorizing Provider   warfarin (COUMADIN) 4 MG tablet Take 2 tablets by mouth daily Have INR rechecked in 3-4 days and contact PCP before resuming as dose adjustment may be needed. 6/18/19 7/18/19  Filemon Henry, DO   metoprolol succinate (TOPROL XL) 100 MG extended release tablet Take 1 tablet by mouth 2 times daily 5/28/19 Lorenda Bushy, DO   cloNIDine (CATAPRES) 0.2 MG tablet TAKE 1 TABLET BY MOUTH THREE TIMES DAILY 5/20/19 Lorenda Bushy, DO   atorvastatin (LIPITOR) 10 MG tablet TAKE ONE TABLET BY MOUTH ONCE DAILY 5/7/19 Lorenda Bushy, DO   furosemide (LASIX) 40 MG tablet TAKE 1 TABLET BY MOUTH THREE TIMES DAILY 3/6/19   Lorenda Bushy, DO   allopurinol (ZYLOPRIM) 100 MG tablet Take 1 tablet by mouth daily 3/6/19   Lorenda Bushy, DO   warfarin (COUMADIN) 10 MG tablet Take 1 tablet by mouth daily Take 10mg daily for 5 days and then resume 8mg daily.  Have INR checked after 3 doses of 10 mg. 3/5/19   Filemon Henry, DO

## 2019-07-10 ENCOUNTER — HOSPITAL ENCOUNTER (OUTPATIENT)
Dept: INFUSION THERAPY | Age: 49
Discharge: HOME | End: 2019-07-10
Payer: MEDICARE

## 2019-07-10 ENCOUNTER — OFFICE VISIT (OUTPATIENT)
Dept: ONCOLOGY | Age: 49
End: 2019-07-10
Payer: MEDICARE

## 2019-07-10 ENCOUNTER — CARE COORDINATION (OUTPATIENT)
Dept: CARE COORDINATION | Age: 49
End: 2019-07-10

## 2019-07-10 VITALS
BODY MASS INDEX: 39.17 KG/M2 | HEART RATE: 53 BPM | DIASTOLIC BLOOD PRESSURE: 65 MMHG | TEMPERATURE: 97.3 F | HEIGHT: 75 IN | SYSTOLIC BLOOD PRESSURE: 134 MMHG | WEIGHT: 315 LBS

## 2019-07-10 DIAGNOSIS — D64.9 ANEMIA, UNSPECIFIED TYPE: ICD-10-CM

## 2019-07-10 DIAGNOSIS — D72.819 LEUKOPENIA, UNSPECIFIED TYPE: ICD-10-CM

## 2019-07-10 DIAGNOSIS — D64.9 ANEMIA, UNSPECIFIED TYPE: Primary | ICD-10-CM

## 2019-07-10 DIAGNOSIS — R53.82 CHRONIC FATIGUE: ICD-10-CM

## 2019-07-10 LAB
ANISOCYTOSIS: ABNORMAL
BASOPHILS ABSOLUTE: 0 E9/L (ref 0–0.2)
BASOPHILS RELATIVE PERCENT: 0.6 % (ref 0–2)
DAT POLYSPECIFIC: NORMAL
EOSINOPHILS ABSOLUTE: 0.23 E9/L (ref 0.05–0.5)
EOSINOPHILS RELATIVE PERCENT: 4.4 % (ref 0–6)
FERRITIN: 32 NG/ML
FOLATE: 6.9 NG/ML (ref 4.8–24.2)
HCT VFR BLD CALC: 29.9 % (ref 37–54)
HEMOGLOBIN: 8.8 G/DL (ref 12.5–16.5)
HYPOCHROMIA: ABNORMAL
IMMATURE RETIC FRACT: 22.8 % (ref 2.3–13.4)
IRON SATURATION: 16 % (ref 20–55)
IRON: 58 MCG/DL (ref 59–158)
LYMPHOCYTES ABSOLUTE: 1.01 E9/L (ref 1.5–4)
LYMPHOCYTES RELATIVE PERCENT: 19.3 % (ref 20–42)
MCH RBC QN AUTO: 28.8 PG (ref 26–35)
MCHC RBC AUTO-ENTMCNC: 29.4 % (ref 32–34.5)
MCV RBC AUTO: 97.7 FL (ref 80–99.9)
MONOCYTES ABSOLUTE: 0.32 E9/L (ref 0.1–0.95)
MONOCYTES RELATIVE PERCENT: 6.1 % (ref 2–12)
NEUTROPHILS ABSOLUTE: 3.71 E9/L (ref 1.8–7.3)
NEUTROPHILS RELATIVE PERCENT: 70.2 % (ref 43–80)
OVALOCYTES: ABNORMAL
PDW BLD-RTO: 19 FL (ref 11.5–15)
PLATELET # BLD: 217 E9/L (ref 130–450)
PMV BLD AUTO: 9.5 FL (ref 7–12)
POIKILOCYTES: ABNORMAL
POLYCHROMASIA: ABNORMAL
RBC # BLD: 3.06 E12/L (ref 3.8–5.8)
RETIC HGB EQUIVALENT: 24.9 PG (ref 28.2–36.6)
RETICULOCYTE ABSOLUTE COUNT: 0.07 E12/L
RETICULOCYTE COUNT PCT: 2.4 % (ref 0.4–1.9)
SCHISTOCYTES: ABNORMAL
TARGET CELLS: ABNORMAL
TEAR DROP CELLS: ABNORMAL
TOTAL IRON BINDING CAPACITY: 370 MCG/DL (ref 250–450)
VITAMIN B-12: 399 PG/ML (ref 211–946)
WBC # BLD: 5.3 E9/L (ref 4.5–11.5)

## 2019-07-10 PROCEDURE — G8417 CALC BMI ABV UP PARAM F/U: HCPCS | Performed by: INTERNAL MEDICINE

## 2019-07-10 PROCEDURE — 85025 COMPLETE CBC W/AUTO DIFF WBC: CPT

## 2019-07-10 PROCEDURE — 82607 VITAMIN B-12: CPT

## 2019-07-10 PROCEDURE — 83550 IRON BINDING TEST: CPT

## 2019-07-10 PROCEDURE — 86880 COOMBS TEST DIRECT: CPT

## 2019-07-10 PROCEDURE — 82746 ASSAY OF FOLIC ACID SERUM: CPT

## 2019-07-10 PROCEDURE — 83540 ASSAY OF IRON: CPT

## 2019-07-10 PROCEDURE — G8427 DOCREV CUR MEDS BY ELIG CLIN: HCPCS | Performed by: INTERNAL MEDICINE

## 2019-07-10 PROCEDURE — 84630 ASSAY OF ZINC: CPT

## 2019-07-10 PROCEDURE — 99205 OFFICE O/P NEW HI 60 MIN: CPT | Performed by: INTERNAL MEDICINE

## 2019-07-10 PROCEDURE — 82525 ASSAY OF COPPER: CPT

## 2019-07-10 PROCEDURE — 86038 ANTINUCLEAR ANTIBODIES: CPT

## 2019-07-10 PROCEDURE — 82728 ASSAY OF FERRITIN: CPT

## 2019-07-10 PROCEDURE — 84165 PROTEIN E-PHORESIS SERUM: CPT

## 2019-07-10 PROCEDURE — 82668 ASSAY OF ERYTHROPOIETIN: CPT

## 2019-07-10 PROCEDURE — 88185 FLOWCYTOMETRY/TC ADD-ON: CPT

## 2019-07-10 PROCEDURE — 85045 AUTOMATED RETICULOCYTE COUNT: CPT

## 2019-07-10 PROCEDURE — 99214 OFFICE O/P EST MOD 30 MIN: CPT

## 2019-07-10 PROCEDURE — 1036F TOBACCO NON-USER: CPT | Performed by: INTERNAL MEDICINE

## 2019-07-10 PROCEDURE — 36415 COLL VENOUS BLD VENIPUNCTURE: CPT

## 2019-07-10 PROCEDURE — 88184 FLOWCYTOMETRY/ TC 1 MARKER: CPT

## 2019-07-10 PROCEDURE — 3017F COLORECTAL CA SCREEN DOC REV: CPT | Performed by: INTERNAL MEDICINE

## 2019-07-10 PROCEDURE — 86703 HIV-1/HIV-2 1 RESULT ANTBDY: CPT

## 2019-07-10 PROCEDURE — 80074 ACUTE HEPATITIS PANEL: CPT

## 2019-07-10 NOTE — PROGRESS NOTES
Lexy Garber  1970 47 y.o. Referring Physician:    PCP: Chet Mari, DO    Vitals:    07/10/19 1321   BP: 134/65   Pulse: 53   Temp: 97.3 °F (36.3 °C)        Wt Readings from Last 3 Encounters:   07/10/19 (!) 339 lb (153.8 kg)   04/16/19 (!) 352 lb 6.4 oz (159.8 kg)   02/14/19 (!) 310 lb (140.6 kg)        Body mass index is 42.37 kg/m². Chief Complaint:  Presents to the clinic for consultation. Had ultrasound of the neck  approx 2 months at [facility]. Denies bleeding episodes. Under Dr Aiyana Fairbanks care for kidney problems. Has shunt in place but has not needed dialysis yet. States \" I feel ok\". He noticed soft, \"lumps\" at right cervical and base of throat 2 months ago. Denies fever, weight loss and night sweats. Chief Complaint   Patient presents with    Consultation         Cancer Staging  No matching staging information was found for the patient. Prior Radiation Therapy? NO    Concurrent Chemo/radiation? NO    Prior Chemotherapy? NO    Prior Hormonal Therapy? NO    Head and Neck Cancer? No, patient does NOT have HN cancer. Current Outpatient Medications:     metoprolol succinate (TOPROL XL) 100 MG extended release tablet, Take 1 tablet by mouth 2 times daily, Disp: 30 tablet, Rfl: 3    cloNIDine (CATAPRES) 0.2 MG tablet, TAKE 1 TABLET BY MOUTH THREE TIMES DAILY, Disp: 90 tablet, Rfl: 3    atorvastatin (LIPITOR) 10 MG tablet, TAKE ONE TABLET BY MOUTH ONCE DAILY, Disp: 30 tablet, Rfl: 3    furosemide (LASIX) 40 MG tablet, TAKE 1 TABLET BY MOUTH THREE TIMES DAILY, Disp: 90 tablet, Rfl: 3    allopurinol (ZYLOPRIM) 100 MG tablet, Take 1 tablet by mouth daily, Disp: 30 tablet, Rfl: 3    warfarin (COUMADIN) 10 MG tablet, Take 1 tablet by mouth daily Take 10mg daily for 5 days and then resume 8mg daily. Have INR checked after 3 doses of 10 mg. (Patient taking differently: Take 8 mg by mouth daily Take 10mg daily for 5 days and then resume 8mg daily. History Narrative    Not on file           Occupation:Disabled  Retired:  NO              Pacemaker/Defibulator/ICD:  Yes    Mediport: No           FALLS RISK SCREENING ASSESSMENT    Instructions:  Assess the patient and [place] the appropriate indicators that are present for fall risk identification. Total the numbers circled and assign a fall risk score from Table 2.  Reassess patient at a minimum every 12 weeks or with status change. Assessment   Date  7/10/2019     1. Mental Ability: confusion/cognitively impaired No - 0       2. Elimination Issues: incontinence, frequency No - 0       3. Ambulatory: use of assistive devices (walker, cane, off-loading devices), attached to equipment (IV pole, oxygen) No - 0     4. Sensory Limitations: dizziness, vertigo, impaired vision No - 0       5. Age Less than 65 years - 0       6. Medication: diuretics, strong analgesics, hypnotics, sedatives, antihypertensive agents   Yes - 3   7. Falls:  recent history of falls within the last 3 months (not to include slipping or tripping)   No - 0   TOTAL 3    If score of 4 or greater was education given? NA       TABLE 2   Risk Score Risk Level Plan of Care   0-3 Little or  No Risk 1. Provide assistance as indicated for ambulation activities  2. Reorient confused/cognitively impaired patient  3. Call-light/bell within patient's reach  4. Chair/bed in low position, stretcher/bed with siderails up except when performing patient care activities  5. Educate patient/family/caregiver on falls prevention  6.  Reassess in 12 weeks or with any noted change in patient condition which places them at a risk for a fall   4-6 Moderate Risk 1. Provide assistance as indicated for ambulation activities  2. Reorient confused/cognitively impaired patient  3. Call-light/bell within patient's reach  4. Chair/bed in low position, stretcher/bed with siderails up except when performing patient care activities  5.   Educate

## 2019-07-10 NOTE — PROGRESS NOTES
[address] [facility] [address] [address]  Dept: [phone]  Loc: [phone]  Attending Consult Note      Reason for Visit:   Anemia. Referring Physician:  Yessi Schilling MD    PCP:  Gera Francisco DO    History of Present Illness:     Mr. Yefri Duque is a pleasant 57-year-old gentleman, with a past medical history significant for CKD, stage IV, history of right IJ thrombosis, on Coumadin, osteoarthritis, diastolic dysfunction, who was referred to the hematology office for evaluation of anemia. CBC from 5/7/2019 was remarkable for a hemoglobin of 8.9, hematocrit 31.6, platelet count is normal at [id number]. She denies any bleeding, no melena or hematochezia, he had a colonoscopy done about 5 years ago according to the patient was unremarkable. Has been feeling tired. Review of Systems;  CONSTITUTIONAL: No fever, chills. Good appetite, feeling tired. ENMT: Eyes: No diplopia; Nose: No epistaxis. Mouth: No sore throat. RESPIRATORY: No hemoptysis, shortness of breath, cough. CARDIOVASCULAR: No chest pain, palpitations. GASTROINTESTINAL: No nausea/vomiting, abdominal pain, diarrhea/constipation. GENITOURINARY: No dysuria, urinary frequency, hematuria. NEURO: No syncope, presyncope, headache.   Remainder:  ROS NEGATIVE    Past Medical History:      Diagnosis Date    Arthritis     Blood circulation, collateral     Cardiomegaly     CKD (chronic kidney disease) stage 4, GFR 15-29 ml/min ([facility])     Depression     Diastolic CHF ([facility])     EF 55%    Encounter regarding vascular access for dialysis for ESRD ([facility] 2/4/2019    History of echocardiogram 4/28/16    EF 04% stage II diastolic    History of echocardiogram 05/31/2016    EF 45%    History of echocardiogram 03/15/2017    EF 40-45%    Hyperlipidemia     Hypertension     Internal jugular vein thrombosis, left ([facility] 2/5/2019    Knee dislocation     left knee dislocated patient rehab self and reinjured   

## 2019-07-11 LAB
HAV IGM SER IA-ACNC: NORMAL
HEPATITIS B CORE IGM ANTIBODY: NORMAL
HEPATITIS B SURFACE ANTIGEN INTERPRETATION: NORMAL
HEPATITIS C ANTIBODY INTERPRETATION: NORMAL
HIV-1 AND HIV-2 ANTIBODIES: NORMAL

## 2019-07-12 LAB
ALBUMIN SERPL-MCNC: 2.7 G/DL (ref 3.5–4.7)
ALPHA-1-GLOBULIN: 0.4 G/DL (ref 0.2–0.4)
ALPHA-2-GLOBULIN: 0.9 G/FL (ref 0.5–1)
ANTI-NUCLEAR ANTIBODY (ANA): NEGATIVE
BETA GLOBULIN: 1 G/DL (ref 0.8–1.3)
ELECTROPHORESIS: ABNORMAL
GAMMA GLOBULIN: 1.8 G/DL (ref 0.7–1.6)
TOTAL PROTEIN: 6.9 G/DL (ref 6.4–8.3)

## 2019-07-13 LAB
COPPER: 143.5 UG/DL (ref 70–140)
ERYTHROPOIETIN: 367 MU/ML (ref 4–27)
ZINC: 42.6 UG/DL (ref 60–120)

## 2019-07-16 ENCOUNTER — CARE COORDINATION (OUTPATIENT)
Dept: CARE COORDINATION | Age: 49
End: 2019-07-16

## 2019-07-16 ENCOUNTER — OFFICE VISIT (OUTPATIENT)
Dept: INTERNAL MEDICINE CLINIC | Age: 49
End: 2019-07-16
Payer: MEDICARE

## 2019-07-16 VITALS
OXYGEN SATURATION: 97 % | HEART RATE: 73 BPM | BODY MASS INDEX: 39.17 KG/M2 | HEIGHT: 75 IN | TEMPERATURE: 97.9 F | WEIGHT: 315 LBS | DIASTOLIC BLOOD PRESSURE: 77 MMHG | SYSTOLIC BLOOD PRESSURE: 129 MMHG

## 2019-07-16 DIAGNOSIS — E66.01 MORBID OBESITY WITH BMI OF 40.0-44.9, ADULT (HCC): ICD-10-CM

## 2019-07-16 DIAGNOSIS — I50.32 DIASTOLIC CHF, CHRONIC (HCC): ICD-10-CM

## 2019-07-16 DIAGNOSIS — N18.4 CKD (CHRONIC KIDNEY DISEASE) STAGE 4, GFR 15-29 ML/MIN (HCC): Primary | Chronic | ICD-10-CM

## 2019-07-16 DIAGNOSIS — F41.9 ANXIETY: Chronic | ICD-10-CM

## 2019-07-16 LAB
Lab: NORMAL
REPORT: NORMAL
THIS TEST SENT TO: NORMAL

## 2019-07-16 PROCEDURE — 99213 OFFICE O/P EST LOW 20 MIN: CPT | Performed by: INTERNAL MEDICINE

## 2019-07-16 PROCEDURE — 1036F TOBACCO NON-USER: CPT | Performed by: INTERNAL MEDICINE

## 2019-07-16 PROCEDURE — G8427 DOCREV CUR MEDS BY ELIG CLIN: HCPCS | Performed by: INTERNAL MEDICINE

## 2019-07-16 PROCEDURE — 99212 OFFICE O/P EST SF 10 MIN: CPT | Performed by: INTERNAL MEDICINE

## 2019-07-16 PROCEDURE — 3017F COLORECTAL CA SCREEN DOC REV: CPT | Performed by: INTERNAL MEDICINE

## 2019-07-16 PROCEDURE — G8417 CALC BMI ABV UP PARAM F/U: HCPCS | Performed by: INTERNAL MEDICINE

## 2019-07-16 NOTE — PROGRESS NOTES
[address] [facility]  -Resident Progress Note-     Nessa Bill / : 1970 / MRN [id number] / Kirstin Sloane 2019    Reason for visit:    Chief Complaint   Patient presents with   [place] Maintenance     Due for hep B, PCV13, Tdap and shingles     Date of last visit:  19    Today's Visit  Nessa Bill is a 47 y.o. male who presents to [address] today for routine follow up. Patient has been doing well. He will be getting labs work done this week for INR and BMP to be done.      Past Medical History:   Diagnosis Date    Arthritis     Blood circulation, collateral     Cardiomegaly     CKD (chronic kidney disease) stage 4, GFR 15-29 ml/min ([facility])     Depression     Diastolic CHF ([facility])     EF 55%    Encounter regarding vascular access for dialysis for ESRD ([facility] [address] 2019    History of echocardiogram 16    EF 80% stage II diastolic    History of echocardiogram 2016    EF 45%    History of echocardiogram 03/15/2017    EF 40-45%    Hyperlipidemia     Hypertension     Internal jugular vein thrombosis, left ([facility] [address] 2019    Knee dislocation     left knee dislocated patient rehab self and reinjured    Obesity (BMI 30-39.9)     bmi 37.8    Occlusion of right subclavian vein ([facility] [address] 2019    PILAR (obstructive sleep apnea) 2019    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     Postoperative pain 2/15/2019    Shoulder disorder     right shoulder degenerative arthritis       Review of Systems  All bolded are positive; please see HPI  General:  Fever, chills, diaphoresis, fatigue, malaise, night sweats, weight loss  Psychological:  Anxiety, disorientation, hallucinations  ENT:  Epistaxis, headaches, vertigo, visual changes, periorbital swelling (mild)  Cardiovascular:  Chest pain, irregular heartbeats, palpitations, paroxysmal nocturnal dyspnea  Respiratory:  Shortness of breath, coughing, sputum production, hemoptysis, wheezing, orthopnea  Gastrointestinal:  Nausea, vomiting, diarrhea, heartburn, constipation, abdominal pain, hematemesis, hematochezia, melena, acholic stools  Genito-Urinary:  Dysuria, urgency, frequency, hematuria  Musculoskeletal:  Joint pain, joint stiffness, joint swelling, muscle pain  Neurology:  Headache, focal neurological deficits, weakness, numbness, paresthesia  Derm:  Rashes, ulcers, excoriations, bruising  Extremities:  Decreased ROM, peripheral edema, mottling    Physical Examination  Vitals:  /77   Pulse 73   Temp 97.9 °F (36.6 °C) (Oral)   Ht 6' 3\" (1.905 m)   Wt (!) 340 lb (154.2 kg)   SpO2 97%   BMI 42.50 kg/m²   General Appearance:  awake, alert, and oriented to person, place, time, and purpose; appears stated age and cooperative; no apparent distress no labored breathing  HEENT:  NCAT; PERRL; conjunctiva pink, sclera clear,    Neck:  no adenopathy, bruit, JVD, tenderness, masses, or nodules; supple, symmetrical, trachea midline, thyroid not enlarged, vein felt around the patient's neck at the level of his clavicles. Lung:  clear to auscultation bilaterally; no use of accessory muscles; no rhonchi, rales, or crackles  Heart:  regular rate and regular rhythm without murmur, rub, or gallop  Abdomen:  soft, nontender, nondistended; normoactive bowel sounds; no organomegaly  Extremities: fistula with palpable thrill to right AC, extremities normal, atraumatic, no cyanosis or edema  Musculokeletal:  no joint swelling, no muscle tenderness. ROM normal in all joints of extremities. Neurologic:  mental status A&Ox3, thought content appropriate; CN II-XII grossly intact; sensation intact, motor strength 5/5 globally; no slurred speech  Osteopathic:  Patient examined in seated position; normal lumbar lordosis and thoracic kyphosis; no TART changes to paraspinal musculature    Laboratory Data - All recent labs were reviewed.  Please see electronic chart for a more comprehensive set of

## 2019-07-17 PROBLEM — E66.01 MORBID OBESITY WITH BMI OF 40.0-44.9, ADULT (HCC): Status: ACTIVE | Noted: 2019-07-17

## 2019-07-17 NOTE — CARE COORDINATION
Ambulatory Care Coordination Note  7/17/2019  CM Risk Score: 10  Charlson 10 Year Mortality Risk Score: 47%     ACC: Sanford Juarez, RN    Summary Note: Called pt to follow up on progress, discuss new issues or concerns, and reinforce/ provide pt education. Pt completed his PCP appt, labs, and RX refills. CHF- pt has been complaint with daily weights. He has steadily lost about 12 lbs in the past 4 months. He denies any new or worsening CHF symptoms. Pt is able to describe the S/S to report. Pt is able to describe Cardiac diet and fluid restrictions and states he is complaint.     -Reinforced/ Provided Education on:  Importance and benefits self monitoring, medication, and diet compliance. -Review CHF action plan:   Zone sheet signs and symptoms to report   Importance and benefits of self monitoring  -Medication teaching   Purpose   Side effects   Scheduling doses   Missed doses and actions to take  -Cardiac Diet review and compliance    -Activity and Exercise   Symptoms indication need to stop and rest   Energy Conservation techniques    Importance and benefits of routine follow up appointments and testing compliance. Plan: Will graduate from [facility] services as goals met and no new needs identified. Pt verbalized understanding and is agreeable.         Care Coordination Interventions    Program Enrollment:  Complex Care  Referral from Primary Care Provider:  Yes  Suggested Interventions and Community Resources  Medication Assistance Program:  Completed (Comment: referral to PAP 4/16/2019 states that medications are affordable)  Medi Set or Pill Pack:  Completed (Comment: 4/16/2019 wfe fills pill organizer)  Specialty Services Referral:  Completed (Comment: Referral- Nephrology)  Zone Management Tools:  Completed (Comment: 4/16/2019- patient given CHF Zones)         Goals Addressed                 This Visit's Progress     COMPLETED: Conditions and Symptoms   On track     I will schedule office visits, as directed by my provider. I will keep my appointment or reschedule if I have to cancel. I will notify my provider of any barriers to my plan of care. I will follow my Zone Management tool to seek urgent or emergent care. I will notify my provider of any symptoms that indicate a worsening of my condition. Make appt with nephrologist  Keep appt for PT/INR  Keep appt with PCP  Barriers: overwhelmed by complexity of regimen, stress and lack of education  Plan for overcoming my barriers: work with care coordinator and read CHF information given to him  Confidence: 5/10  Anticipated Goal Completion Date: 6/1/2019; 7/30/19 4/16/2019- information given to patient and will review at next call. 6/7/19- Pt continues to be non compliant with PT/INR  6/21/19- Goal completion date and interventions updated as still in progress  7/17/19: Pt completed lab work and appointments. Goal completed as MET.  COMPLETED: Medication Management        I will notify my provider of any problems with medications, like adverse effects or side effects. I will notify my provider/Care Coordinator if I am unable to afford my medications. I will have my PT/INR done as directed    Barriers: lack of education  Plan for overcoming my barriers: ACC to educate patient and family about the need for routine blood draws  Confidence: 5/10  Anticipated Goal Completion Date: 4/1/2019; 6/7/19; 7/16/19 6/7/19:  Pt has gotten PT/INR more consistently but not as directed, taking medications as directed  6/21/19: Pt has not gotten PT/INR since 6/10/19 and no appt scheduled  7/17/19:  Pt has medications filled, lab work completed. Goal completed as MET            Prior to Admission medications    Medication Sig Start Date End Date Taking? Authorizing Provider   warfarin (COUMADIN) 4 MG tablet Take 2 tablets by mouth daily Have INR rechecked in 3-4 days and contact PCP before resuming as dose adjustment may be needed.  6/18/19 7/18/19

## 2019-07-18 ENCOUNTER — HOSPITAL ENCOUNTER (OUTPATIENT)
Dept: INFUSION THERAPY | Age: 49
Discharge: HOME | End: 2019-07-18
Payer: MEDICARE

## 2019-07-18 ENCOUNTER — TELEPHONE (OUTPATIENT)
Dept: VASCULAR SURGERY | Age: 49
End: 2019-07-18

## 2019-07-18 ENCOUNTER — HOSPITAL ENCOUNTER (OUTPATIENT)
Age: 49
Discharge: HOME | End: 2019-07-18
Payer: MEDICARE

## 2019-07-18 ENCOUNTER — OFFICE VISIT (OUTPATIENT)
Dept: ONCOLOGY | Age: 49
End: 2019-07-18
Payer: MEDICARE

## 2019-07-18 VITALS
WEIGHT: 315 LBS | TEMPERATURE: 97.1 F | HEIGHT: 75 IN | HEART RATE: 53 BPM | BODY MASS INDEX: 39.17 KG/M2 | SYSTOLIC BLOOD PRESSURE: 129 MMHG | DIASTOLIC BLOOD PRESSURE: 65 MMHG | OXYGEN SATURATION: 94 %

## 2019-07-18 DIAGNOSIS — N18.4 CKD (CHRONIC KIDNEY DISEASE) STAGE 4, GFR 15-29 ML/MIN (HCC): Chronic | ICD-10-CM

## 2019-07-18 DIAGNOSIS — D64.9 ANEMIA, UNSPECIFIED TYPE: Primary | ICD-10-CM

## 2019-07-18 LAB
ANION GAP SERPL CALCULATED.3IONS-SCNC: 10 MMOL/L (ref 7–16)
BUN BLDV-MCNC: 43 MG/DL (ref 6–20)
CALCIUM SERPL-MCNC: 8.9 MG/DL (ref 8.6–10.2)
CHLORIDE BLD-SCNC: 105 MMOL/L (ref 98–107)
CO2: 30 MMOL/L (ref 22–29)
CREAT SERPL-MCNC: 3.9 MG/DL (ref 0.7–1.2)
GFR AFRICAN AMERICAN: 20
GFR NON-AFRICAN AMERICAN: 20 ML/MIN/1.73
GLUCOSE BLD-MCNC: 106 MG/DL (ref 74–99)
PATHOLOGIST REVIEW: NORMAL
POTASSIUM SERPL-SCNC: 3.7 MMOL/L (ref 3.5–5)
SODIUM BLD-SCNC: 145 MMOL/L (ref 132–146)

## 2019-07-18 PROCEDURE — 36415 COLL VENOUS BLD VENIPUNCTURE: CPT

## 2019-07-18 PROCEDURE — 99213 OFFICE O/P EST LOW 20 MIN: CPT | Performed by: INTERNAL MEDICINE

## 2019-07-18 PROCEDURE — G8427 DOCREV CUR MEDS BY ELIG CLIN: HCPCS | Performed by: INTERNAL MEDICINE

## 2019-07-18 PROCEDURE — 99214 OFFICE O/P EST MOD 30 MIN: CPT | Performed by: INTERNAL MEDICINE

## 2019-07-18 PROCEDURE — 1036F TOBACCO NON-USER: CPT | Performed by: INTERNAL MEDICINE

## 2019-07-18 PROCEDURE — 3017F COLORECTAL CA SCREEN DOC REV: CPT | Performed by: INTERNAL MEDICINE

## 2019-07-18 PROCEDURE — 80048 BASIC METABOLIC PNL TOTAL CA: CPT

## 2019-07-18 PROCEDURE — G8417 CALC BMI ABV UP PARAM F/U: HCPCS | Performed by: INTERNAL MEDICINE

## 2019-07-21 NOTE — PROGRESS NOTES
[address]  Dept: [phone]  Loc: [phone]  Attending Progress Note      Reason for Visit:   Anemia. Referring Physician:  Fausto Ac MD    PCP:  Dallin Saravia DO    History of Present Illness:     Mr. Guero García is a pleasant 44-year-old gentleman, with a past medical history significant for CKD, stage IV, history of right IJ thrombosis, on Coumadin, osteoarthritis, diastolic dysfunction, who was referred to the hematology office for evaluation of anemia. CBC from 5/7/2019 was remarkable for a hemoglobin of 8.9, hematocrit 31.6, platelet count is normal at [id number]. She denies any bleeding, no melena or hematochezia, he had a colonoscopy done about 5 years ago according to the patient was unremarkable. Has been feeling tired. Review of Systems;  CONSTITUTIONAL: No fever, chills. Good appetite, feeling tired. ENMT: Eyes: No diplopia; Nose: No epistaxis. Mouth: No sore throat. RESPIRATORY: No hemoptysis, shortness of breath, cough. CARDIOVASCULAR: No chest pain, palpitations. GASTROINTESTINAL: No nausea/vomiting, abdominal pain, diarrhea/constipation. GENITOURINARY: No dysuria, urinary frequency, hematuria. NEURO: No syncope, presyncope, headache.   Remainder:  ROS NEGATIVE    Past Medical History:      Diagnosis Date    Arthritis     Blood circulation, collateral     Cardiomegaly     CKD (chronic kidney disease) stage 4, GFR 15-29 ml/min ([facility])     Depression     Diastolic CHF ([facility])     EF 55%    Encounter regarding vascular access for dialysis for ESRD ([facility] [address] 2/4/2019    History of echocardiogram 4/28/16    EF 85% stage II diastolic    History of echocardiogram 05/31/2016    EF 45%    History of echocardiogram 03/15/2017    EF 40-45%    Hyperlipidemia     Hypertension     Internal jugular vein thrombosis, left ([facility] [address] 2/5/2019    Knee dislocation     left knee dislocated patient rehab self and reinjured   

## 2019-07-22 ENCOUNTER — TELEPHONE (OUTPATIENT)
Dept: VASCULAR SURGERY | Age: 49
End: 2019-07-22

## 2019-07-22 ENCOUNTER — OFFICE VISIT (OUTPATIENT)
Dept: VASCULAR SURGERY | Age: 49
End: 2019-07-22
Payer: MEDICARE

## 2019-07-22 DIAGNOSIS — N18.6 ENCOUNTER REGARDING VASCULAR ACCESS FOR DIALYSIS FOR ESRD (HCC): Primary | ICD-10-CM

## 2019-07-22 DIAGNOSIS — Z99.2 ENCOUNTER REGARDING VASCULAR ACCESS FOR DIALYSIS FOR ESRD (HCC): Primary | ICD-10-CM

## 2019-07-22 PROCEDURE — G8417 CALC BMI ABV UP PARAM F/U: HCPCS | Performed by: SURGERY

## 2019-07-22 PROCEDURE — 99214 OFFICE O/P EST MOD 30 MIN: CPT | Performed by: SURGERY

## 2019-07-22 PROCEDURE — 1036F TOBACCO NON-USER: CPT | Performed by: SURGERY

## 2019-07-22 PROCEDURE — 3017F COLORECTAL CA SCREEN DOC REV: CPT | Performed by: SURGERY

## 2019-07-22 PROCEDURE — G8427 DOCREV CUR MEDS BY ELIG CLIN: HCPCS | Performed by: SURGERY

## 2019-07-22 NOTE — PROGRESS NOTES
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     Postoperative pain 2/15/2019    Shoulder disorder     right shoulder degenerative arthritis     Past Surgical History:        Procedure Laterality Date    AV FISTULA REPAIR Right 6/28/13    CARDIAC PACEMAKER PLACEMENT  4/27/12    DDD PACEMAKER    [facility]    CARPAL TUNNEL RELEASE      rt hand    DIALYSIS FISTULA CREATION Right 02/28/2104    ECHO COMPL W DOP COLOR FLOW  4/21/2012 EF 60%         ECHO COMPL W DOP COLOR FLOW  9/7/2012         OTHER SURGICAL HISTORY  01/22/2018    Right arm fistulogram and fistuloplasty by Dr Cheatham Knee Left 2012    icd   [address] Bilateral 2/14/2019    RIGHT AXILLARY TO LEFT AXILLARY BYPASS performed by June Hamilton MD at [address] [place] [address] ARTERY/VEIN N/A 2/14/2019    EVACUATION OF HEMATOMA performed by June Hamilton MD at [place] [address]       Current Medications:   Current Outpatient Medications   Medication Sig Dispense Refill    warfarin (COUMADIN) 4 MG tablet Take 2 tablets by mouth daily Have INR rechecked in 3-4 days and contact PCP before resuming as dose adjustment may be needed.  60 tablet 3    metoprolol succinate (TOPROL XL) 100 MG extended release tablet Take 1 tablet by mouth 2 times daily 30 tablet 3    cloNIDine (CATAPRES) 0.2 MG tablet TAKE 1 TABLET BY MOUTH THREE TIMES DAILY 90 tablet 3    atorvastatin (LIPITOR) 10 MG tablet TAKE ONE TABLET BY MOUTH ONCE DAILY 30 tablet 3    furosemide (LASIX) 40 MG tablet TAKE 1 TABLET BY MOUTH THREE TIMES DAILY 90 tablet 3    allopurinol (ZYLOPRIM) 100 MG tablet Take 1 tablet by mouth daily 30 tablet 3    melatonin 3 MG TABS tablet Take 1 tablet by mouth daily (Patient not taking: Reported on 7/22/2019) 15 tablet 0    Cholecalciferol (VITAMIN D3) 5000 UNITS CAPS Take 1 capsule by mouth daily (Patient not taking: Reported on 7/22/2019) 30 capsule 0    Ascorbic Acid (VITAMIN C) 500 MG tablet Take 500 mg by mouth daily STOP PREOP MED       No current facility-administered medications for this visit. Allergies:  Patient has no known allergies.   Social History     Socioeconomic History    Marital status:      Spouse name: Not on file    Number of children: Not on file    Years of education: Not on file    Highest education level: Not on file   Occupational History    Occupation: unemployed   Social Needs    Financial resource strain: Not on file    Food insecurity:     Worry: Not on file     Inability: Not on file    Transportation needs:     Medical: Not on file     Non-medical: Not on file   Tobacco Use    Smoking status: Never Smoker    Smokeless tobacco: Never Used   Substance and Sexual Activity    Alcohol use: No    Drug use: No     Comment: quit July of 2016    Sexual activity: Yes     Partners: Female   Lifestyle    Physical activity:     Days per week: Not on file     Minutes per session: Not on file    Stress: Not on file   Relationships    Social connections:     Talks on phone: Not on file     Gets together: Not on file     Attends Congregational service: Not on file     Active member of club or organization: Not on file     Attends meetings of clubs or organizations: Not on file     Relationship status: Not on file    Intimate partner violence:     Fear of current or ex partner: Not on file     Emotionally abused: Not on file     Physically abused: Not on file     Forced sexual activity: Not on file   Other Topics Concern    Not on file   Social History Narrative    Not on file     Family History   Problem Relation Age of Onset    Diabetes Mother     Kidney Disease Mother      Labs  Lab Results   Component Value Date    WBC 5.3 07/10/2019    HGB 8.8 (L) 07/10/2019    HCT 29.9 (L) 07/10/2019     07/10/2019    PROTIME 44.4 (H) 06/10/2019    INR 3.9 06/10/2019    APTT 31.1 02/14/2019    K 3.7 07/18/2019    BUN 43 (H) 07/18/2019

## 2019-07-30 ENCOUNTER — ANESTHESIA (OUTPATIENT)
Dept: OPERATING ROOM | Age: 49
End: 2019-07-30

## 2019-07-30 ENCOUNTER — ANESTHESIA EVENT (OUTPATIENT)
Dept: OPERATING ROOM | Age: 49
End: 2019-07-30

## 2019-07-30 ENCOUNTER — HOSPITAL ENCOUNTER (OUTPATIENT)
Age: 49
Setting detail: OUTPATIENT SURGERY
Discharge: HOME | End: 2019-07-30
Attending: SURGERY | Admitting: SURGERY
Payer: MEDICARE

## 2019-07-30 VITALS
SYSTOLIC BLOOD PRESSURE: 136 MMHG | RESPIRATION RATE: 16 BRPM | DIASTOLIC BLOOD PRESSURE: 69 MMHG | TEMPERATURE: 97.6 F | OXYGEN SATURATION: 96 % | BODY MASS INDEX: 39.17 KG/M2 | HEIGHT: 75 IN | WEIGHT: 315 LBS | HEART RATE: 64 BPM

## 2019-07-30 DIAGNOSIS — Z01.812 PRE-OPERATIVE LABORATORY EXAMINATION: Primary | ICD-10-CM

## 2019-07-30 DIAGNOSIS — Z79.01 ANTICOAGULATED ON COUMADIN: ICD-10-CM

## 2019-07-30 DIAGNOSIS — Z01.818 PRE-OP TESTING: ICD-10-CM

## 2019-07-30 LAB
ANION GAP SERPL CALCULATED.3IONS-SCNC: 10 MMOL/L (ref 7–16)
BUN BLDV-MCNC: 41 MG/DL (ref 4–19)
CALCIUM SERPL-MCNC: 9.1 MG/DL (ref 8.6–10.2)
CHLORIDE BLD-SCNC: 101 MMOL/L (ref 98–107)
CO2: 31 MMOL/L (ref 22–29)
CREAT SERPL-MCNC: 4.1 MG/DL (ref 0.4–0.7)
EKG ATRIAL RATE: 100 BPM
EKG Q-T INTERVAL: 560 MS
EKG QRS DURATION: 162 MS
EKG QTC CALCULATION (BAZETT): 510 MS
EKG R AXIS: -105 DEGREES
EKG T AXIS: 92 DEGREES
EKG VENTRICULAR RATE: 50 BPM
GFR AFRICAN AMERICAN: 19
GFR NON-AFRICAN AMERICAN: 19 ML/MIN/1.73
GLUCOSE BLD-MCNC: 117 MG/DL (ref 70–110)
HCT VFR BLD CALC: 29.4 % (ref 45–66)
HEMOGLOBIN: 8.6 G/DL (ref 14.5–22)
INR BLD: 1.9
MCH RBC QN AUTO: 29.2 PG (ref 30–42)
MCHC RBC AUTO-ENTMCNC: 29.3 % (ref 29–37)
MCV RBC AUTO: 99.7 FL (ref 95–121)
PDW BLD-RTO: 19.2 FL (ref 11–19)
PLATELET # BLD: 206 E9/L (ref 130–500)
PMV BLD AUTO: 9.9 FL (ref 7–12)
POTASSIUM REFLEX MAGNESIUM: 3.9 MMOL/L (ref 3.4–4.5)
PROTHROMBIN TIME: 21.5 SEC (ref 9.3–12.4)
RBC # BLD: 2.95 E12/L (ref 4.7–6.3)
SODIUM BLD-SCNC: 142 MMOL/L (ref 132–146)
WBC # BLD: 4.3 E9/L (ref 9.4–34)

## 2019-07-30 PROCEDURE — 80048 BASIC METABOLIC PNL TOTAL CA: CPT

## 2019-07-30 PROCEDURE — 93010 ELECTROCARDIOGRAM REPORT: CPT | Performed by: INTERNAL MEDICINE

## 2019-07-30 PROCEDURE — 85027 COMPLETE CBC AUTOMATED: CPT

## 2019-07-30 PROCEDURE — 2580000003 HC RX 258: Performed by: SURGERY

## 2019-07-30 PROCEDURE — 36415 COLL VENOUS BLD VENIPUNCTURE: CPT

## 2019-07-30 PROCEDURE — 93005 ELECTROCARDIOGRAM TRACING: CPT | Performed by: ANESTHESIOLOGY

## 2019-07-30 PROCEDURE — 85610 PROTHROMBIN TIME: CPT

## 2019-07-30 RX ORDER — FENTANYL CITRATE 50 UG/ML
100 INJECTION, SOLUTION INTRAMUSCULAR; INTRAVENOUS ONCE
Status: DISCONTINUED | OUTPATIENT
Start: 2019-07-30 | End: 2019-07-30 | Stop reason: HOSPADM

## 2019-07-30 RX ORDER — SODIUM CHLORIDE 0.9 % (FLUSH) 0.9 %
10 SYRINGE (ML) INJECTION EVERY 12 HOURS SCHEDULED
Status: DISCONTINUED | OUTPATIENT
Start: 2019-07-30 | End: 2019-07-30 | Stop reason: HOSPADM

## 2019-07-30 RX ORDER — ROPIVACAINE HYDROCHLORIDE 5 MG/ML
30 INJECTION, SOLUTION EPIDURAL; INFILTRATION; PERINEURAL ONCE
Status: DISCONTINUED | OUTPATIENT
Start: 2019-07-30 | End: 2019-07-30 | Stop reason: HOSPADM

## 2019-07-30 RX ORDER — SODIUM CHLORIDE 0.9 % (FLUSH) 0.9 %
10 SYRINGE (ML) INJECTION PRN
Status: DISCONTINUED | OUTPATIENT
Start: 2019-07-30 | End: 2019-07-30 | Stop reason: HOSPADM

## 2019-07-30 RX ORDER — MIDAZOLAM HYDROCHLORIDE 1 MG/ML
2 INJECTION INTRAMUSCULAR; INTRAVENOUS ONCE
Status: DISCONTINUED | OUTPATIENT
Start: 2019-07-30 | End: 2019-07-30 | Stop reason: HOSPADM

## 2019-07-30 RX ORDER — SODIUM CHLORIDE 9 MG/ML
INJECTION, SOLUTION INTRAVENOUS CONTINUOUS
Status: DISCONTINUED | OUTPATIENT
Start: 2019-07-30 | End: 2019-07-30 | Stop reason: HOSPADM

## 2019-07-30 RX ADMIN — SODIUM CHLORIDE: 9 INJECTION, SOLUTION INTRAVENOUS at 11:49

## 2019-07-30 ASSESSMENT — PAIN SCALES - GENERAL: PAINLEVEL_OUTOF10: 0

## 2019-07-30 ASSESSMENT — ENCOUNTER SYMPTOMS: DYSPNEA ACTIVITY LEVEL: AFTER AMBULATING 2 FLIGHTS OF STAIRS

## 2019-07-30 NOTE — ANESTHESIA PRE PROCEDURE
MD(Interpreting   physician) on 11/26/2018 09:32 PM   ----------------------------------------------------------------    Anesthesia Evaluation  Patient summary reviewed and Nursing notes reviewed no history of anesthetic complications:   Airway: Mallampati: II  TM distance: <3 FB   Neck ROM: full  Comment: Pt has beard  Mouth opening: > = 3 FB Dental: normal exam     Comment: Pt denies any loose, missing, chipped, or cracked teeth    Pulmonary:   (+) sleep apnea: on CPAP,  decreased breath sounds,                             Cardiovascular:    (+) hypertension:, valvular problems/murmurs (MILD TR): MR, pacemaker: AICD and pacemaker, dysrhythmias: atrial fibrillation, CHF: diastolic, LLANES: after ambulating 2 flights of stairs, pulmonary hypertension: moderate, hyperlipidemia      ECG reviewed  Rhythm: regular  Rate: normal  Echocardiogram reviewed  Stress test reviewed       Beta Blocker:  Dose within 24 Hrs      ROS comment: Cardiomegaly     ECHO 11/10/18   Ejection fraction is visually estimated at 40-45%.   Mild mitral regurgitation is present.   Mild to moderate tricuspid regurgitation.   Pulmonary hypertension is moderate .   Mild pulmonic regurgitation present. Neuro/Psych:   (+) psychiatric history:depression/anxiety              ROS comment: Right shoulder degenerative arthritis  Left knee pain  Right ankle pain  Chronic fatigue GI/Hepatic/Renal:   (+) renal disease: CRI and ESRD, morbid obesity          Endo/Other:    (+) blood dyscrasia: anemia and anticoagulation therapy, arthritis (Glenohumeral arthritis, Acromioclavicular joint arthritis):., .          Pt had no PAT visit        ROS comment: Erectile dysfunction  Gout Abdominal:   (+) obese,     Abdomen: soft.     Vascular:           ROS comment: Hx: Jugular vein thrombosis, right  Occlusion of right subclavian vein   Internal jugular vein thrombosis, left     Right arm fistulogram and fistuloplasty 1/22/18  Right axillary to left axillary bypass 2/14/19. Anesthesia Plan      regional and MAC     ASA 4     (Pt agrees to peripheral nerve block, also general anesthesia as back-up. #20g LH)  Induction: intravenous. MIPS: Postoperative opioids intended, Prophylactic antiemetics administered and Postoperative trial extubation. Anesthetic plan and risks discussed with patient. Use of blood products discussed with patient whom consented to blood products. Plan discussed with CRNA and attending. Carlos Alberto Mireles RN , [facility]  7/30/2019      Pt seen, examined, chart reviewed, plan discussed.   Dina Casillas  7/30/2019  11:53 AM

## 2019-07-30 NOTE — PROGRESS NOTES
Patient admitted to Same Day Surgery. Pre-op instructions given.
Surgery canceled for elevated INR.
are to spend the night in the hospital.     PARKING INSTRUCTIONS:   [x] Arrival Time:___1230__________  · [x] Parking lot '\"I\"  is located on [facility] (the corner of [facility] and [facility]). To enter, press the button and the gate will lift. A free token will be provided to exit the lot. One car per patient is allowed to park in this lot. All other cars are to park on [address] [place] [address] either in the parking garage or the handicap lot. [] To reach the [facility] lobby from [address] [place] [address], upon entering the hospital, take elevator B to the 3rd floor. EDUCATION INSTRUCTIONS:      [] Knee or hip replacement booklet & exercise pamphlets given. [] [address] placed in chart. [] Pre-admission Testing educational folder given  [] Incentive Spirometry,coughing & deep breathing exercises reviewed. []Medication information sheet(s)   []Fluoroscopy-Xray used in surgery reviewed with patient. Educational pamphlet placed in chart. []Pain: Post-op pain is normal and to be expected. You will be asked to rate your pain from 0-10(a zero is not acceptable-education is needed). Your post-op pain goal is:  [] Ask your nurse for your pain medication. [] Joint camp offered. [] Joint replacement booklets given. [] Other:___________________________    MEDICATION INSTRUCTIONS:   [x]Bring a complete list of your medications, please write the last time you took the medicine, give this list to the nurse. [x] Take the following medications the morning of surgery with 1-2 ounces of water: SEE MED LIST  [] Stop herbal supplements and vitamins 5 days before your surgery. [] DO NOT take any diabetic medicine the morning of surgery. Follow instructions for insulin the day before surgery. [] If you are diabetic and your blood sugar is low or you feel symptomatic, you may drink 1-2 ounces of apple juice or take a glucose tablet.   The morning of your procedure, you may call the

## 2019-07-31 ENCOUNTER — TELEPHONE (OUTPATIENT)
Dept: VASCULAR SURGERY | Age: 49
End: 2019-07-31

## 2019-08-01 ENCOUNTER — TELEPHONE (OUTPATIENT)
Dept: VASCULAR SURGERY | Age: 49
End: 2019-08-01

## 2019-08-05 ENCOUNTER — INITIAL CONSULT (OUTPATIENT)
Dept: SURGERY | Age: 49
End: 2019-08-05
Payer: MEDICARE

## 2019-08-05 ENCOUNTER — TELEPHONE (OUTPATIENT)
Dept: SURGERY | Age: 49
End: 2019-08-05

## 2019-08-05 VITALS
SYSTOLIC BLOOD PRESSURE: 126 MMHG | HEIGHT: 75 IN | DIASTOLIC BLOOD PRESSURE: 59 MMHG | BODY MASS INDEX: 39.17 KG/M2 | RESPIRATION RATE: 18 BRPM | TEMPERATURE: 98.4 F | HEART RATE: 60 BPM | WEIGHT: 315 LBS | OXYGEN SATURATION: 96 %

## 2019-08-05 DIAGNOSIS — N18.4 ANEMIA DUE TO STAGE 4 CHRONIC KIDNEY DISEASE (HCC): Primary | ICD-10-CM

## 2019-08-05 DIAGNOSIS — K21.9 GASTROESOPHAGEAL REFLUX DISEASE WITHOUT ESOPHAGITIS: ICD-10-CM

## 2019-08-05 DIAGNOSIS — D63.1 ANEMIA DUE TO STAGE 4 CHRONIC KIDNEY DISEASE (HCC): Primary | ICD-10-CM

## 2019-08-05 PROCEDURE — G8417 CALC BMI ABV UP PARAM F/U: HCPCS | Performed by: SURGERY

## 2019-08-05 PROCEDURE — 3017F COLORECTAL CA SCREEN DOC REV: CPT | Performed by: SURGERY

## 2019-08-05 PROCEDURE — 99203 OFFICE O/P NEW LOW 30 MIN: CPT | Performed by: SURGERY

## 2019-08-05 PROCEDURE — G8427 DOCREV CUR MEDS BY ELIG CLIN: HCPCS | Performed by: SURGERY

## 2019-08-05 PROCEDURE — 1036F TOBACCO NON-USER: CPT | Performed by: SURGERY

## 2019-08-05 NOTE — TELEPHONE ENCOUNTER
Per Gaby Soria rep, no prior authorization is required for scheduled outpatient procedure (cpt [id number] + [id number]). Call ref# [id number].     Electronically signed by Nalini Oneil RN on 8/5/2019 at 2:23 PM

## 2019-08-05 NOTE — PROGRESS NOTES
General Surgery History and Physical    Patient's Name/Date of Birth: Lexy Garber / 1970    Date: 8/5/2019     Surgeon: Sondra Luke M.D.    PCP: Jessenia Russell DO     Chief Complaint: Screening for colon cancer, needs colonoscopy    HPI:   Lexy Garber is a 47 y.o. male who presents for evaluation of screening colonoscopy. No history of hemorrhoids, denies history of rectal bleeding, melena, constipation, abdominal pain, abdominal operations. History of CKD, anemia for many years. Denies change in bowel habits. On coumadin for IJ thrombosis from CV catheter. Denies dark or black stools. Denies reflux, heart burn. Denies history of colon cancer in their family. No history of recent weight loss. They are a nonsmoker. Admits GERD at times, no severe. No ulcer disease hx. Had fistula revision on 8/22 and off coumadin before that procedure. No Known Allergies    Prior to Admission medications    Medication Sig Start Date End Date Taking? Authorizing Provider   polyethylene glycol (GOLYTELY) 236 g solution Take 4,000 mLs by mouth once for 1 dose 8/5/19 8/5/19 Yes Kelvin Cedeño MD   metoprolol succinate (TOPROL XL) 100 MG extended release tablet Take 1 tablet by mouth 2 times daily 5/28/19  Yes Jessenia Russell DO   cloNIDine (CATAPRES) 0.2 MG tablet TAKE 1 TABLET BY MOUTH THREE TIMES DAILY 5/20/19  Yes Jessenia Russell DO   atorvastatin (LIPITOR) 10 MG tablet TAKE ONE TABLET BY MOUTH ONCE DAILY 5/7/19  Yes Jessenia Russell DO   furosemide (LASIX) 40 MG tablet TAKE 1 TABLET BY MOUTH THREE TIMES DAILY 3/6/19  Yes Jessenia Russell DO   allopurinol (ZYLOPRIM) 100 MG tablet Take 1 tablet by mouth daily 3/6/19  Yes Jessenia Russell DO   warfarin (COUMADIN) 4 MG tablet Take 2 tablets by mouth daily Have INR rechecked in 3-4 days and contact PCP before resuming as dose adjustment may be needed.  6/18/19 7/22/19  Jessenia Russell DO   melatonin 3 MG TABS tablet

## 2019-08-19 RX ORDER — METOPROLOL SUCCINATE 100 MG/1
100 TABLET, EXTENDED RELEASE ORAL 2 TIMES DAILY
Qty: 30 TABLET | Refills: 3 | Status: SHIPPED | OUTPATIENT
Start: 2019-08-19

## 2019-08-19 NOTE — PROGRESS NOTES
[address] [facility]                                                                                                                    PRE OP INSTRUCTIONS FOR  Michael Garces        Date: 8/19/2019    Date of surgery: 8/20/19   Arrival Time: Hospital will call you between 5pm and 7pm with your final arrival time for surgery    1. Do not eat or drink anything after midnight prior to surgery. This includes no water, chewing gum, mints or ice chips. 2. Take the following medications with a small sip of water on the morning of Surgery:  Metoprolol and Clonidine    3. Diabetics may take evening dose of insulin but none after midnight. If you feel symptomatic or low blood sugar morning of surgery drink 1-2 ounces of apple juice only. 4. Aspirin, Ibuprofen, Advil, Naproxen, Vitamin E and other Anti-inflammatory products should be stopped  before surgery  as directed by your physician. Take Tylenol only unless instructed otherwise by your surgeon. 5. Check with your Doctor regarding stopping Plavix, Coumadin, Lovenox, Eliquis, Effient, or other blood thinners. 6. Do not smoke,use illicit drugs and do not drink any alcoholic beverages 24 hours prior to surgery. 7. You may brush your teeth the morning of surgery. DO NOT SWALLOW WATER    8. You MUST make arrangements for a responsible adult to take you home after your surgery. You will not be allowed to leave alone or drive yourself home. It is strongly suggested someone stay with you the first 24 hrs. Your surgery will be cancelled if you do not have a ride home. 9. PEDIATRIC PATIENTS ONLY:  A parent/legal guardian must accompany a child scheduled for surgery and plan to stay at the hospital until the child is discharged. Please do not bring other children with you.     10. Please wear simple, loose fitting clothing to the hospital.  Reche Baseman not bring valuables (money, credit cards, checkbooks, etc.) Do not wear any makeup (including no

## 2019-08-20 ENCOUNTER — ANESTHESIA EVENT (OUTPATIENT)
Dept: ENDOSCOPY | Age: 49
End: 2019-08-20
Payer: MEDICARE

## 2019-08-20 ENCOUNTER — HOSPITAL ENCOUNTER (OUTPATIENT)
Age: 49
Setting detail: OUTPATIENT SURGERY
Discharge: HOME | End: 2019-08-20
Attending: SURGERY | Admitting: SURGERY
Payer: MEDICARE

## 2019-08-20 ENCOUNTER — ANESTHESIA (OUTPATIENT)
Dept: ENDOSCOPY | Age: 49
End: 2019-08-20
Payer: MEDICARE

## 2019-08-20 VITALS
BODY MASS INDEX: 39.17 KG/M2 | SYSTOLIC BLOOD PRESSURE: 118 MMHG | RESPIRATION RATE: 16 BRPM | DIASTOLIC BLOOD PRESSURE: 66 MMHG | OXYGEN SATURATION: 91 % | HEART RATE: 53 BPM | TEMPERATURE: 98.1 F | WEIGHT: 315 LBS | HEIGHT: 75 IN

## 2019-08-20 VITALS
RESPIRATION RATE: 16 BRPM | SYSTOLIC BLOOD PRESSURE: 110 MMHG | DIASTOLIC BLOOD PRESSURE: 76 MMHG | OXYGEN SATURATION: 91 %

## 2019-08-20 PROCEDURE — 6360000002 HC RX W HCPCS: Performed by: NURSE ANESTHETIST, CERTIFIED REGISTERED

## 2019-08-20 PROCEDURE — 2709999900 HC NON-CHARGEABLE SUPPLY: Performed by: SURGERY

## 2019-08-20 PROCEDURE — G0121 COLON CA SCRN NOT HI RSK IND: HCPCS | Performed by: SURGERY

## 2019-08-20 PROCEDURE — 7100000011 HC PHASE II RECOVERY - ADDTL 15 MIN: Performed by: SURGERY

## 2019-08-20 PROCEDURE — 3609027000 HC COLONOSCOPY: Performed by: SURGERY

## 2019-08-20 PROCEDURE — 43235 EGD DIAGNOSTIC BRUSH WASH: CPT | Performed by: SURGERY

## 2019-08-20 PROCEDURE — 7100000010 HC PHASE II RECOVERY - FIRST 15 MIN: Performed by: SURGERY

## 2019-08-20 PROCEDURE — 3700000001 HC ADD 15 MINUTES (ANESTHESIA): Performed by: SURGERY

## 2019-08-20 PROCEDURE — 3700000000 HC ANESTHESIA ATTENDED CARE: Performed by: SURGERY

## 2019-08-20 PROCEDURE — 2580000003 HC RX 258: Performed by: SURGERY

## 2019-08-20 PROCEDURE — 3609017100 HC EGD: Performed by: SURGERY

## 2019-08-20 RX ORDER — SODIUM CHLORIDE 0.9 % (FLUSH) 0.9 %
10 SYRINGE (ML) INJECTION EVERY 12 HOURS SCHEDULED
Status: DISCONTINUED | OUTPATIENT
Start: 2019-08-20 | End: 2019-08-20 | Stop reason: HOSPADM

## 2019-08-20 RX ORDER — SODIUM CHLORIDE 9 MG/ML
INJECTION, SOLUTION INTRAVENOUS CONTINUOUS
Status: DISCONTINUED | OUTPATIENT
Start: 2019-08-20 | End: 2019-08-20 | Stop reason: HOSPADM

## 2019-08-20 RX ORDER — PROPOFOL 10 MG/ML
INJECTION, EMULSION INTRAVENOUS PRN
Status: DISCONTINUED | OUTPATIENT
Start: 2019-08-20 | End: 2019-08-20 | Stop reason: SDUPTHER

## 2019-08-20 RX ORDER — SODIUM CHLORIDE 0.9 % (FLUSH) 0.9 %
10 SYRINGE (ML) INJECTION PRN
Status: DISCONTINUED | OUTPATIENT
Start: 2019-08-20 | End: 2019-08-20 | Stop reason: HOSPADM

## 2019-08-20 RX ADMIN — SODIUM CHLORIDE: 9 INJECTION, SOLUTION INTRAVENOUS at 09:49

## 2019-08-20 RX ADMIN — PROPOFOL 320 MG: 10 INJECTION, EMULSION INTRAVENOUS at 10:10

## 2019-08-20 ASSESSMENT — PAIN - FUNCTIONAL ASSESSMENT: PAIN_FUNCTIONAL_ASSESSMENT: 0-10

## 2019-08-20 ASSESSMENT — PAIN SCALES - GENERAL: PAINLEVEL_OUTOF10: 0

## 2019-08-20 ASSESSMENT — ENCOUNTER SYMPTOMS: DYSPNEA ACTIVITY LEVEL: AFTER AMBULATING 2 FLIGHTS OF STAIRS

## 2019-08-20 NOTE — ANESTHESIA PRE PROCEDURE
Department of Anesthesiology  Preprocedure Note       Name:  Taya Newby   Age:  47 y.o.  :  1970                                          MRN:  [id number]         Date:  2019      Surgeon: Malia Penn):  Krishna Hernández MD    Procedure: EGD (N/A )  COLONOSCOPY (N/A )    Medications prior to admission:   Prior to Admission medications    Medication Sig Start Date End Date Taking? Authorizing Provider   metoprolol succinate (TOPROL XL) 100 MG extended release tablet Take 1 tablet by mouth 2 times daily 19nde Co, DO   warfarin (COUMADIN) 4 MG tablet Take 2 tablets by mouth daily Have INR rechecked in 3-4 days and contact PCP before resuming as dose adjustment may be needed. 19e Co, DO   cloNIDine (CATAPRES) 0.2 MG tablet TAKE 1 TABLET BY MOUTH THREE TIMES DAILY 19nde Co, DO   atorvastatin (LIPITOR) 10 MG tablet TAKE ONE TABLET BY MOUTH ONCE DAILY 19nde Co, DO   furosemide (LASIX) 40 MG tablet TAKE 1 TABLET BY MOUTH THREE TIMES DAILY 3/6/19   Rinaa Co, DO   allopurinol (ZYLOPRIM) 100 MG tablet Take 1 tablet by mouth daily 3/6/19   Riana Co, DO   melatonin 3 MG TABS tablet Take 1 tablet by mouth daily 17   Katina Gomez DO       Current medications:    No current facility-administered medications for this visit. No current outpatient medications on file.      Facility-Administered Medications Ordered in Other Visits   Medication Dose Route Frequency Provider Last Rate Last Dose    0.9 % sodium chloride infusion   Intravenous Continuous Krishna Hernández MD        sodium chloride flush 0.9 % injection 10 mL  10 mL Intravenous 2 times per day Krishna Hernández MD        sodium chloride flush 0.9 % injection 10 mL  10 mL Intravenous PRN Krishna Hernández MD           Allergies:  No Known Allergies    Problem List:    Patient Active Problem List   Diagnosis Code    HTN (hypertension) I10    Hyperlipidemia with target LDL less than 100 E78.5    CKD (chronic kidney disease) stage 4, GFR 15-29 ml/min ([facility]) N18.4    Depression C73.3    Diastolic CHF, chronic ([facility]) I50.32    Normocytic anemia D64.9    Right shoulder pain M25.511    Left knee pain M25.562    Atrial fibrillation ([facility]) I48.91    Anxiety F41.9    Erectile dysfunction N52.9    Obesity E66.9    Glenohumeral arthritis M19.019    Impingement syndrome of shoulder M75.40    Acromioclavicular joint arthritis M19.019    Right ankle pain M25.571    Acute on chronic diastolic congestive heart failure ([facility]) I50.33    Hyperuricemia E79.0    Gout M10.9    Chronic fatigue R53.82    Acute on chronic diastolic (congestive) heart failure ([facility]) I50.33    Jugular vein thrombosis, right I82.890    Obstructive sleep apnea syndrome G47.33    Anticoagulated on Coumadin Z79.01    Occlusion of right subclavian vein ([facility]) I82. B11    Encounter regarding vascular access for dialysis for ESRD ([facility] [address] N18.6, Z99.2    Internal jugular vein thrombosis, left ([facility] [address] I82. C12    Postoperative pain G89.18    Morbid obesity with BMI of 40.0-44.9, adult ([facility]) E66.01, Z68.41    Anemia D64.9       Past Medical History:        Diagnosis Date    Arthritis     Blood circulation, collateral     Cardiomegaly     CKD (chronic kidney disease) stage 4, GFR 15-29 ml/min ([facility])     Depression     Diastolic CHF ([facility])     EF 55%    Encounter regarding vascular access for dialysis for ESRD ([facility] [address] 2/4/2019    History of echocardiogram 4/28/16    EF 35% stage II diastolic    History of echocardiogram 05/31/2016    EF 45%    History of echocardiogram 03/15/2017    EF 40-45%    Hx of blood clots     Hyperlipidemia     Hypertension     Internal jugular vein thrombosis, left ([facility] [address] 2/5/2019    Knee dislocation     left knee dislocated patient rehab self and reinjured    Obesity (BMI 30-39.9)     bmi 37.8    Occlusion of right subclavian vein LLANES: after ambulating 2 flights of stairs, pulmonary hypertension: moderate, hyperlipidemia      ECG reviewed  Rhythm: regular  Rate: normal  Echocardiogram reviewed  Stress test reviewed       Beta Blocker:  Dose within 24 Hrs      ROS comment: Cardiomegaly     ECHO 11/10/18   Ejection fraction is visually estimated at 40-45%.  Sunday Downy mitral regurgitation is present.   Mild to moderate tricuspid regurgitation.   Pulmonary hypertension is moderate .   Mild pulmonic regurgitation present. Neuro/Psych:   (+) psychiatric history:depression/anxiety              ROS comment: Right shoulder degenerative arthritis  Left knee pain  Right ankle pain  Chronic fatigue GI/Hepatic/Renal:   (+) renal disease: CRI and ESRD, morbid obesity          Endo/Other:    (+) blood dyscrasia: anemia and anticoagulation therapy, arthritis (Glenohumeral arthritis, Acromioclavicular joint arthritis):., .          Pt had no PAT visit        ROS comment: Erectile dysfunction  Gout Abdominal:   (+) obese,         Vascular:           ROS comment: Hx: Jugular vein thrombosis, right  Occlusion of right subclavian vein   Internal jugular vein thrombosis, left     Right arm fistulogram and fistuloplasty 1/22/18  Right axillary to left axillary bypass 2/14/19. Anesthesia Plan      MAC     ASA 4       Induction: intravenous. Anesthetic plan and risks discussed with patient. Plan discussed with CRNA and attending. Rosana Epley, MD , [facility]  8/20/2019      Pt seen, examined, chart reviewed, plan discussed.   Johnny Washburn  8/20/2019  8:56 AM

## 2019-08-20 NOTE — OP NOTE
[address] [place] [address] [facility]           Operative Report    DATE OF PROCEDURE: 8/20/2019    Abiola Shape    SURGEON: Nely Cortes MD.     ASSISTANT: None    PREOPERATIVE DIAGNOSES:  Anemia    POSTOPERATIVE DIAGNOSES:   (1) No Hiatal Hernia  (2) No Esophagitis  (3) No Gastritis    OPERATION: [id number]    ANESTHESIA: LMAC    COMPLICATIONS: None. History and consent: This is a 47y.o. year old male who is having Anemia of unknown origin, hx CKD. I have discussed with the patient the indication, alternatives, and the possible risks and/or complications of upper endoscopy and the conscious sedation anesthesia. The patient and/or family understands and agrees to proceed. Monitoring and Safety:    The patient was placed on a cardiac monitor and vital signs, pulse oximetry and level of consciousness were continuously evaluated throughout the procedure. The patient was closely monitored until recovery from the medications was complete and the patient had returned to baseline status. Anesthesia was present at all times during the procedure. OPERATIONS: The patient was placed on the table and sedated while blood pressure, pulse and pulse oximetry were continuously monitored by the anesthesia team. A bite block was placed prior to sedation and the patient was placed in the left lateral decubitus position. A lubricated scope was easily passed into the upper esophagus which looked normal. The distal esophagus looked normal. The scope was passed into the stomach and retroflexed. The gastroesophageal junction was at 45cm. There was no hiatal hernia. The scope was passed down toward the pylorus. The antral mucosa looked normal. Biopsy was taken to check for H. pylori. The scope was then passed through the pylorus into the duodenal bulb which looked normal, then around to the distal duodenum which looked normal, and the scope was then withdrawn.  The patient tolerated

## 2019-08-20 NOTE — OP NOTE
[facility]  Colonoscopy Operative Report    DATE OF PROCEDURE: 8/20/2019     PREOPERATIVE DIAGNOSIS: Anemia, Screening for colon cancer    POSTOPERATIVE DIAGNOSIS/FINDINGS: Diverticulosis    SURGEON: Palma Dobbins MD    ASSISTANT: None    OPERATION: Total Colonoscopy to the cecum     ANESTHESIA: Local monitored anesthesia. COMPLICATIONS: None. PRIOR TO THE EXAM: Gen: comfortable, no distress, awake and alert; Lungs: Clear;  Heart:regular rate and rhythm, normal S1S2     BRIEF HISTORY:  This is a 47 y.o. male who presents for Screening for colon cancer, anemia. The patient has no personal and family history of colon cancer. My recommendation is to proceed with colonoscopy. The patient was advised of the risks, benefits, complications and options including the risk of bleeding and perforation. The patient understood and agreed to proceed. PROCEDURE:  In the left side down decubitus position, a digital rectal exam was performed. There were no masses or abnormalities noted. The scope was lubricated and inserted into the anus. The scope was then passed under direct visualization in a retrograde fashion to the base of the cecum. The light was seen in the patient's right lower quadrant. The ileocecal valve was photographed. The prep was adequate. Some pressure/positioning manuevers were required for complete passage. As the scope is withdrawn, visualization of the ascending and transverse colon was unremarkable. The scope is then pulled past the splenic flexure into the descending and sigmoid colon. Mild descending diverticular disease with no inflammation. The scope is then pulled through the sigmoid colon into the rectum. The scope is retroflexed at the anal verge. Small internal hemorrhoids and prominent rectal veins were visible, no bleeding. The scope was then straightened and removed. Total withdrawal time was 10 minutes.   There was no evidence of inflammatory

## 2019-08-21 NOTE — H&P
General Surgery History and Physical      Patient's Name/Date of Birth: Munir Rodriguez / 1970    Date: August 21, 2019     PCP: Deysi Puga DO      HPI:   Munir Rodriguez is a 47 y.o. male with CHF and ESRD secondary to HTN with a left sided pacemaker who presents for elective AVF revision with fistulagram.  He is right handed. He takes coumadin for an IJ thrombosis from CV catheter and A fib. He has only had dialysis for 3 days through a temp line in his groin.   He is here for a fistula gram and revision of the AVF    He has the following history :  Previous Access Procedures  6/28/13 R BC AVF  Dr Cuevas Gave   2/28/14 R UE fistula revision Dr. Cuevas Gave   3/2017 Plasty of central venous stenosis  Dr. Lorna Valencia   2017 Revision of R UE fistula aneurysmal areas Dr. Magdi Barros   [facility]   1/22/19 R UE fistulagram,  Plasty of mid cephalic vein and subclavian vein with 8x40 mustang   2/5/19 Venogram   2/14/19 Right to left axillary vein bypass 8 mm ptfe ringed   2/14/19 Evacuation of hematoma            Patient Active Problem List   Diagnosis    HTN (hypertension)    Hyperlipidemia with target LDL less than 100    CKD (chronic kidney disease) stage 4, GFR 15-29 ml/min (HCC)    Depression    Diastolic CHF, chronic (HCC)    Normocytic anemia    Right shoulder pain    Left knee pain    Atrial fibrillation (HCC)    Anxiety    Erectile dysfunction    Obesity    Glenohumeral arthritis    Impingement syndrome of shoulder    Acromioclavicular joint arthritis    Right ankle pain    Acute on chronic diastolic congestive heart failure (HCC)    Hyperuricemia    Gout    Chronic fatigue    Acute on chronic diastolic (congestive) heart failure ([address]    Jugular vein thrombosis, right    Obstructive sleep apnea syndrome    Anticoagulated on Coumadin    Occlusion of right subclavian vein ([address]    Encounter regarding vascular access for dialysis for ESRD [facility])    Internal jugular vein thrombosis, left (HCC)    Postoperative pain    Morbid obesity with BMI of 40.0-44.9, adult (HCC)    Anemia       Past Medical History:   Diagnosis Date    Arthritis     Blood circulation, collateral     Cardiomegaly     CKD (chronic kidney disease) stage 4, GFR 15-29 ml/min (HCC)     Depression     Diastolic CHF (HCC)     EF 55%    Encounter regarding vascular access for dialysis for ESRD ([facility] 2/4/2019    History of echocardiogram 4/28/16    EF 36% stage II diastolic    History of echocardiogram 05/31/2016    EF 45%    History of echocardiogram 03/15/2017    EF 40-45%    Hx of blood clots     Hyperlipidemia     Hypertension     Internal jugular vein thrombosis, left ([facility] 2/5/2019    Knee dislocation     left knee dislocated patient rehab self and reinjured    Obesity (BMI 30-39.9)     bmi 37.8    Occlusion of right subclavian vein ([facility] 2/4/2019    PILAR (obstructive sleep apnea) 2019    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     Postoperative pain 2/15/2019    Shoulder disorder     right shoulder degenerative arthritis       Past Surgical History:   Procedure Laterality Date    AV FISTULA REPAIR Right 6/28/13    CARDIAC PACEMAKER PLACEMENT  4/27/12    [facility]D PAC[facility]AKER    [facility]    CARPAL TUNNEL RELEASE      rt hand    COLONOSCOPY N/A 8/20/2019    COLONOSCOPY performed by Yuriy Bond MD at [address] Right 02/28/2104    ECHO COMPL W DOP COLOR FLOW  4/21/2012 EF 60%         ECHO COMPL W DOP COLOR FLOW  9/7/2012         OTHER SURGICAL HISTORY  01/22/2018    Right arm fistulogram and fistuloplasty by Dr Richie Carlson Left 2012    icd   [address] Bilateral 2/14/2019    RIGHT AXILLARY TO LEFT AXILLARY BYPASS performed by Tamara Lassiter MD at [address] ARTERY/VEIN N/A 2/14/2019    EVACUATION OF HEMATOMA performed by Tamara Lassiter MD at [address] [facility] mentioned in the above HPI. Specific negatives are listed below but may not include all those reviewed.     General ROS: negative obtundation, AMS  ENT ROS: negative rhinorrhea, epistaxis  Allergy and Immunology ROS: negative itchy/watery eyes or nasal congestion  Hematological and Lymphatic ROS: negative spontaneous bleeding or bruising  Endocrine ROS: negative  lethargy, mood swings, palpitations or polydipsia/polyuria  Respiratory ROS: negative sputum changes, stridor, tachypnea or wheezing  Cardiovascular ROS: negative for - loss of consciousness, murmur or orthopnea  Gastrointestinal ROS: negative for - hematochezia or hematemesis  Genito-Urinary ROS: negative for -  genital discharge or hematuria  Musculoskeletal ROS: negative for - focal weakness, gangrene  Psych/Neuro ROS: negative for - visual or auditory hallucinations, suicidal ideation    Physical exam:   Wt (!) 320 lb (145.2 kg)   BMI 40.00 kg/m²   General appearance:  NAD, appears stated age  Head: NCAT, PERRLA, EOMI, red conjunctiva  Neck: supple, no masses, trachea midline  Lungs: Equal chest rise bilateral, no retractions, no wheezing  Heart: Reg rate  Abdomen: soft, non tender, nondistended  Skin; warm and dry, no cyanosis  Gu: no cva tenderness  Extremities: atraumatic, no focal motor deficits, no open wounds, R AVF with good thrill, 2 + radial pulse  Psych: No tremor, visual hallucinations        Assessment:  Andrew Duncan is a 47 y.o. male with ESRD requiring long term HD access, currently receiving dialysis through a tunneled line  Patient Active Problem List   Diagnosis    HTN (hypertension)    Hyperlipidemia with target LDL less than 100    CKD (chronic kidney disease) stage 4, GFR 15-29 ml/min (HCC)    Depression    Diastolic CHF, chronic (HCC)    Normocytic anemia    Right shoulder pain    Left knee pain    Atrial fibrillation (HCC)    Anxiety    Erectile dysfunction    Obesity    Glenohumeral arthritis    Impingement

## 2019-08-22 ENCOUNTER — HOSPITAL ENCOUNTER (OUTPATIENT)
Age: 49
Setting detail: OUTPATIENT SURGERY
Discharge: HOME | End: 2019-08-22
Attending: SURGERY | Admitting: SURGERY
Payer: MEDICARE

## 2019-08-22 ENCOUNTER — ANESTHESIA EVENT (OUTPATIENT)
Dept: OPERATING ROOM | Age: 49
End: 2019-08-22
Payer: MEDICARE

## 2019-08-22 ENCOUNTER — ANESTHESIA (OUTPATIENT)
Dept: OPERATING ROOM | Age: 49
End: 2019-08-22
Payer: MEDICARE

## 2019-08-22 VITALS
HEIGHT: 75 IN | HEART RATE: 84 BPM | RESPIRATION RATE: 16 BRPM | WEIGHT: 315 LBS | SYSTOLIC BLOOD PRESSURE: 158 MMHG | TEMPERATURE: 97 F | DIASTOLIC BLOOD PRESSURE: 69 MMHG | BODY MASS INDEX: 39.17 KG/M2 | OXYGEN SATURATION: 98 %

## 2019-08-22 VITALS — OXYGEN SATURATION: 100 % | DIASTOLIC BLOOD PRESSURE: 112 MMHG | SYSTOLIC BLOOD PRESSURE: 149 MMHG

## 2019-08-22 DIAGNOSIS — Z99.2 ENCOUNTER REGARDING VASCULAR ACCESS FOR DIALYSIS FOR ESRD (HCC): Primary | Chronic | ICD-10-CM

## 2019-08-22 DIAGNOSIS — N18.6 ENCOUNTER REGARDING VASCULAR ACCESS FOR DIALYSIS FOR ESRD (HCC): Primary | Chronic | ICD-10-CM

## 2019-08-22 LAB
ABO/RH: NORMAL
ANION GAP SERPL CALCULATED.3IONS-SCNC: 10 MMOL/L (ref 7–16)
ANTIBODY SCREEN: NORMAL
BUN BLDV-MCNC: 32 MG/DL (ref 4–19)
CALCIUM SERPL-MCNC: 9.5 MG/DL (ref 8.6–10.2)
CHLORIDE BLD-SCNC: 102 MMOL/L (ref 98–107)
CO2: 31 MMOL/L (ref 22–29)
CREAT SERPL-MCNC: 3.6 MG/DL (ref 0.4–0.7)
GFR AFRICAN AMERICAN: 22
GFR NON-AFRICAN AMERICAN: 22 ML/MIN/1.73
GLUCOSE BLD-MCNC: 116 MG/DL (ref 70–110)
HCT VFR BLD CALC: 28.6 % (ref 45–66)
HEMOGLOBIN: 8.3 G/DL (ref 14.5–22)
INR BLD: 1.4
MAGNESIUM: 2.2 MG/DL (ref 1.6–2.6)
MCH RBC QN AUTO: 29 PG (ref 30–42)
MCHC RBC AUTO-ENTMCNC: 29 % (ref 29–37)
MCV RBC AUTO: 100 FL (ref 95–121)
PDW BLD-RTO: 18.6 FL (ref 11–19)
PLATELET # BLD: 203 E9/L (ref 130–500)
PMV BLD AUTO: 8.9 FL (ref 7–12)
POTASSIUM REFLEX MAGNESIUM: 3.3 MMOL/L (ref 3.4–4.5)
PROTHROMBIN TIME: 16.2 SEC (ref 9.3–12.4)
RBC # BLD: 2.86 E12/L (ref 4.7–6.3)
SODIUM BLD-SCNC: 143 MMOL/L (ref 132–146)
WBC # BLD: 5.1 E9/L (ref 9.4–34)

## 2019-08-22 PROCEDURE — 7100000011 HC PHASE II RECOVERY - ADDTL 15 MIN: Performed by: SURGERY

## 2019-08-22 PROCEDURE — 2500000003 HC RX 250 WO HCPCS: Performed by: ANESTHESIOLOGY

## 2019-08-22 PROCEDURE — 2500000003 HC RX 250 WO HCPCS: Performed by: SURGERY

## 2019-08-22 PROCEDURE — 3600000012 HC SURGERY LEVEL 2 ADDTL 15MIN: Performed by: SURGERY

## 2019-08-22 PROCEDURE — 80048 BASIC METABOLIC PNL TOTAL CA: CPT

## 2019-08-22 PROCEDURE — 6360000002 HC RX W HCPCS: Performed by: ANESTHESIOLOGY

## 2019-08-22 PROCEDURE — 2580000003 HC RX 258: Performed by: NURSE ANESTHETIST, CERTIFIED REGISTERED

## 2019-08-22 PROCEDURE — 6360000002 HC RX W HCPCS: Performed by: SURGERY

## 2019-08-22 PROCEDURE — 3700000001 HC ADD 15 MINUTES (ANESTHESIA): Performed by: SURGERY

## 2019-08-22 PROCEDURE — 36832 AV FISTULA REVISION OPEN: CPT | Performed by: SURGERY

## 2019-08-22 PROCEDURE — 86900 BLOOD TYPING SEROLOGIC ABO: CPT

## 2019-08-22 PROCEDURE — 86901 BLOOD TYPING SEROLOGIC RH(D): CPT

## 2019-08-22 PROCEDURE — 3600000002 HC SURGERY LEVEL 2 BASE: Performed by: SURGERY

## 2019-08-22 PROCEDURE — 83735 ASSAY OF MAGNESIUM: CPT

## 2019-08-22 PROCEDURE — 2580000003 HC RX 258: Performed by: SURGERY

## 2019-08-22 PROCEDURE — 7100000010 HC PHASE II RECOVERY - FIRST 15 MIN: Performed by: SURGERY

## 2019-08-22 PROCEDURE — 2709999900 HC NON-CHARGEABLE SUPPLY: Performed by: SURGERY

## 2019-08-22 PROCEDURE — 85610 PROTHROMBIN TIME: CPT

## 2019-08-22 PROCEDURE — 86850 RBC ANTIBODY SCREEN: CPT

## 2019-08-22 PROCEDURE — 3700000000 HC ANESTHESIA ATTENDED CARE: Performed by: SURGERY

## 2019-08-22 PROCEDURE — 85027 COMPLETE CBC AUTOMATED: CPT

## 2019-08-22 PROCEDURE — 36415 COLL VENOUS BLD VENIPUNCTURE: CPT

## 2019-08-22 RX ORDER — ONDANSETRON 4 MG/1
4 TABLET, FILM COATED ORAL DAILY PRN
Qty: 12 TABLET | Refills: 0 | Status: SHIPPED | OUTPATIENT
Start: 2019-08-22 | End: 2019-09-03

## 2019-08-22 RX ORDER — PROMETHAZINE HYDROCHLORIDE 25 MG/ML
6.25 INJECTION, SOLUTION INTRAMUSCULAR; INTRAVENOUS
Status: DISCONTINUED | OUTPATIENT
Start: 2019-08-22 | End: 2019-08-22 | Stop reason: HOSPADM

## 2019-08-22 RX ORDER — DOCUSATE SODIUM 100 MG/1
100 CAPSULE, LIQUID FILLED ORAL 2 TIMES DAILY
Qty: 50 CAPSULE | Refills: 0 | Status: SHIPPED | OUTPATIENT
Start: 2019-08-22

## 2019-08-22 RX ORDER — SODIUM CHLORIDE 9 MG/ML
INJECTION, SOLUTION INTRAVENOUS CONTINUOUS PRN
Status: DISCONTINUED | OUTPATIENT
Start: 2019-08-22 | End: 2019-08-22 | Stop reason: SDUPTHER

## 2019-08-22 RX ORDER — MIDAZOLAM HYDROCHLORIDE 1 MG/ML
2 INJECTION INTRAMUSCULAR; INTRAVENOUS ONCE
Status: COMPLETED | OUTPATIENT
Start: 2019-08-22 | End: 2019-08-22

## 2019-08-22 RX ORDER — SODIUM CHLORIDE 0.9 % (FLUSH) 0.9 %
10 SYRINGE (ML) INJECTION PRN
Status: DISCONTINUED | OUTPATIENT
Start: 2019-08-22 | End: 2019-08-22 | Stop reason: HOSPADM

## 2019-08-22 RX ORDER — LABETALOL HYDROCHLORIDE 5 MG/ML
5 INJECTION, SOLUTION INTRAVENOUS EVERY 10 MIN PRN
Status: DISCONTINUED | OUTPATIENT
Start: 2019-08-22 | End: 2019-08-22 | Stop reason: HOSPADM

## 2019-08-22 RX ORDER — HYDRALAZINE HYDROCHLORIDE 20 MG/ML
5 INJECTION INTRAMUSCULAR; INTRAVENOUS EVERY 10 MIN PRN
Status: DISCONTINUED | OUTPATIENT
Start: 2019-08-22 | End: 2019-08-22 | Stop reason: HOSPADM

## 2019-08-22 RX ORDER — FENTANYL CITRATE 50 UG/ML
100 INJECTION, SOLUTION INTRAMUSCULAR; INTRAVENOUS ONCE
Status: COMPLETED | OUTPATIENT
Start: 2019-08-22 | End: 2019-08-22

## 2019-08-22 RX ORDER — SODIUM CHLORIDE 0.9 % (FLUSH) 0.9 %
10 SYRINGE (ML) INJECTION EVERY 12 HOURS SCHEDULED
Status: DISCONTINUED | OUTPATIENT
Start: 2019-08-22 | End: 2019-08-22 | Stop reason: HOSPADM

## 2019-08-22 RX ORDER — SODIUM CHLORIDE 9 MG/ML
INJECTION, SOLUTION INTRAVENOUS CONTINUOUS
Status: DISCONTINUED | OUTPATIENT
Start: 2019-08-22 | End: 2019-08-22 | Stop reason: HOSPADM

## 2019-08-22 RX ORDER — HYDROCODONE BITARTRATE AND ACETAMINOPHEN 5; 325 MG/1; MG/1
1 TABLET ORAL EVERY 6 HOURS PRN
Qty: 20 TABLET | Refills: 0 | Status: SHIPPED | OUTPATIENT
Start: 2019-08-22 | End: 2019-08-27

## 2019-08-22 RX ORDER — MEPERIDINE HYDROCHLORIDE 50 MG/ML
12.5 INJECTION INTRAMUSCULAR; INTRAVENOUS; SUBCUTANEOUS EVERY 5 MIN PRN
Status: DISCONTINUED | OUTPATIENT
Start: 2019-08-22 | End: 2019-08-22 | Stop reason: HOSPADM

## 2019-08-22 RX ORDER — BUPIVACAINE HYDROCHLORIDE 7.5 MG/ML
30 INJECTION, SOLUTION EPIDURAL; RETROBULBAR ONCE
Status: COMPLETED | OUTPATIENT
Start: 2019-08-22 | End: 2019-08-22

## 2019-08-22 RX ADMIN — BUPIVACAINE HYDROCHLORIDE 225 MG: 7.5 INJECTION, SOLUTION EPIDURAL; RETROBULBAR at 13:36

## 2019-08-22 RX ADMIN — CEFAZOLIN SODIUM 3 G: 10 INJECTION, POWDER, FOR SOLUTION INTRAVENOUS at 15:22

## 2019-08-22 RX ADMIN — SODIUM CHLORIDE: 9 INJECTION, SOLUTION INTRAVENOUS at 15:02

## 2019-08-22 RX ADMIN — SODIUM CHLORIDE: 9 INJECTION, SOLUTION INTRAVENOUS at 11:06

## 2019-08-22 RX ADMIN — MIDAZOLAM 2 MG: 1 INJECTION INTRAMUSCULAR; INTRAVENOUS at 13:28

## 2019-08-22 RX ADMIN — FENTANYL CITRATE 100 MCG: 50 INJECTION, SOLUTION INTRAMUSCULAR; INTRAVENOUS at 13:28

## 2019-08-22 ASSESSMENT — PULMONARY FUNCTION TESTS
PIF_VALUE: 0
PIF_VALUE: 1
PIF_VALUE: 0
PIF_VALUE: 1
PIF_VALUE: 0
PIF_VALUE: 1
PIF_VALUE: 0
PIF_VALUE: 1
PIF_VALUE: 0

## 2019-08-22 ASSESSMENT — PAIN SCALES - GENERAL
PAINLEVEL_OUTOF10: 0

## 2019-08-22 ASSESSMENT — PAIN - FUNCTIONAL ASSESSMENT: PAIN_FUNCTIONAL_ASSESSMENT: 0-10

## 2019-08-22 ASSESSMENT — ENCOUNTER SYMPTOMS: DYSPNEA ACTIVITY LEVEL: AFTER AMBULATING 2 FLIGHTS OF STAIRS

## 2019-08-22 NOTE — ANESTHESIA PRE PROCEDURE
Department of Anesthesiology  Preprocedure Note       Name:  Priya Gaston   Age:  47 y.o.  :  1970                                          MRN:  [id number]         Date:  2019      Surgeon: James Ludwig):  Linda Gill MD    Procedure: REVISION AV FISTULA WITH FISTULOGRAM - RIGHT ARM (Right )    Medications prior to admission:   Prior to Admission medications    Medication Sig Start Date End Date Taking? Authorizing Provider   metoprolol succinate (TOPROL XL) 100 MG extended release tablet Take 1 tablet by mouth 2 times daily 19   Julianna Hanson, DO   warfarin (COUMADIN) 4 MG tablet Take 2 tablets by mouth daily Have INR rechecked in 3-4 days and contact PCP before resuming as dose adjustment may be needed. 19  Julianna Hanson, DO   cloNIDine (CATAPRES) 0.2 MG tablet TAKE 1 TABLET BY MOUTH THREE TIMES DAILY 19   Julianna Hanson, DO   atorvastatin (LIPITOR) 10 MG tablet TAKE ONE TABLET BY MOUTH ONCE DAILY 19   Julianna Hanson, DO   furosemide (LASIX) 40 MG tablet TAKE 1 TABLET BY MOUTH THREE TIMES DAILY 3/6/19   Julianna Hanson, DO   allopurinol (ZYLOPRIM) 100 MG tablet Take 1 tablet by mouth daily 3/6/19   Julianna Hanson, DO   melatonin 3 MG TABS tablet Take 1 tablet by mouth daily 17   Guilherme Beaver DO       Current medications:    No current facility-administered medications for this visit. No current outpatient medications on file.      Facility-Administered Medications Ordered in Other Visits   Medication Dose Route Frequency Provider Last Rate Last Dose    0.9 % sodium chloride infusion   Intravenous Continuous Linda Gill  mL/hr at 19 1106      sodium chloride flush 0.9 % injection 10 mL  10 mL Intravenous 2 times per day Linda Gill MD        sodium chloride flush 0.9 % injection 10 mL  10 mL Intravenous PRN Linda Gill MD        ceFAZolin (ANCEF) 3 g in is seen   bilaterally. Cardiac silhouette remains enlarged, with prominence of   the left ventricular configuration, unchanged. Mediastinal contour and   visualized osseous structures are not significantly changed.           Impression   No acute cardiopulmonary disease, without acute infiltrate   or significant change since  12/15/2017.     4/24/12 NM MYOCARDIAL SPECT    Impression- Dilated left ventricle with global hypokinesia.               Lexiscan stress-        The patient was administered 0.4 mg of intravenous Lexiscan during   pharmacological stress. During stress and at rest the patient received   33.5 and 23.1 mCi of Tc 99m tetrofosmin respectively. Gated images were   performed in the usual imaging planes. Fixed perfusion abnormality   involves the inferior left ventricular wall. No evidence of a   reversible perfusion defect induced by pharmacological stress.        Impression-    1. No evidence of reversible perfusion abnormality. 2. Fixed perfusion abnormality involving the inferior left ventricular   wall.     11/11/2018  8:07 AM - Tanner, sidney Incoming Scans From [facility]     Component Value Ref Range & Units Status Collected Lab   Ventricular Rate 50  BPM Final 11/10/2018 11:00 AM [facility]   Atrial Rate 267  BPM Final 11/10/2018 11:00 AM [facility]   QRS Duration 126  ms Final 11/10/2018 11:00 AM [facility]   Q-T Interval 452  ms Final 11/10/2018 11:00 AM [facility]   QTc Calculation (Bazett) 412  ms Final 11/10/2018 11:00 AM [facility]   R Axis -37  degrees Final 11/10/2018 11:00 AM [facility]   T [place] 169  degrees Final 11/10/2018 11:00 AM [facility]   Testing Performed By     Lab - Abbreviation Name Director Address Valid Date Range   360-[facility] HM MUSE Unknown Unknown 04/18/16 0721-Present   Narrative   Performed by: Vamshi Mustafa rhythm  Abnormal ECG  When compared with ECG of 14-MAR-2017 07:27,  Vent.  rate has decreased BY  27 BPM  Confirmed by Sridhar Osorio ([id number]) on 11/11/2018 8:06:32 AM     11/10/18 ECHO    Findings      Left Ventricle   Left ventricle is severely enlarged .   Moderate concentric left ventricular hypertrophy.   Ejection fraction is visually estimated at 40-45%.   Mild global wall hypokinesis   Indeterminate diastolic function.   No evidence of left ventricular mass or thrombus noted.      Right Ventricle   Mildly dilated right ventricle.   Right ventricle global systolic function is normal .   Pacer wire visualized in right ventricle.      Left Atrium   The left atrium is severely dilated.   Interatrial septum appears intact.      Right Atrium   Moderately enlarged right atrium size.      Mitral Valve   Structurally normal mitral valve.   Mild mitral regurgitation is present.   No evidence of mitral valve stenosis.      Tricuspid Valve   The tricuspid valve appears structurally normal.   Mild to moderate tricuspid regurgitation.   Pulmonary hypertension is moderate .   RVSP is 61 mmHg.      Aortic Valve   The aortic valve is trileaflet.   Focal calcification of the right coronary cusp   No evidence of aortic valve regurgitation. No hemodynamically significant   aortic stenosis is present.      Pulmonic Valve   The pulmonic valve was not well visualized.   Mild pulmonic regurgitation present.      Pericardial Effusion   No evidence of pericardial effusion.      Aorta   Aortic root within normal limits.      Conclusions      Summary   Compared to prior echo, changes noted.  Technically adequate study.   Moderate concentric left ventricular hypertrophy.   Ejection fraction is visually estimated at 40-45%.   Mild global wall hypokinesis   Indeterminate diastolic function.   Mild mitral regurgitation is present.   Mild to moderate tricuspid regurgitation.   Pulmonary hypertension is moderate .   RVSP is 61 mmHg.   Mild pulmonic regurgitation present.      Signature      ----------------------------------------------------------------   Electronically signed by [employer]

## 2019-08-22 NOTE — ANESTHESIA POSTPROCEDURE EVALUATION
Department of Anesthesiology  Postprocedure Note    Patient: Anastasia Oseguera  MRN: [id number]  YOB: 1970  Date of evaluation: 8/22/2019  Time:  5:40 PM     Procedure Summary     Date:  08/22/19 Room / Location:  [facility]    Anesthesia Start:  1502 Anesthesia Stop:  1185    Procedure:  REVISION AV FISTULA  RIGHT ARM (Right ) Diagnosis:  (CHRONIC RENAL FAILURE)    Surgeon:  Corey Avila MD Responsible Provider:  Marline Wharton MD    Anesthesia Type:  MAC ASA Status:  4          Anesthesia Type: MAC    Estevan Phase I: Estevan Score: 10    Estevan Phase II:      Last vitals: Reviewed and per EMR flowsheets.        Anesthesia Post Evaluation    Patient location during evaluation: PACU  Patient participation: complete - patient participated  Level of consciousness: awake and alert  Pain score: 1  Airway patency: patent  Nausea & Vomiting: no nausea and no vomiting  Complications: no  Cardiovascular status: hemodynamically stable  Respiratory status: acceptable  Hydration status: euvolemic

## 2019-08-22 NOTE — PROGRESS NOTES
Admitted to [facility]  Instructions given to pt and wife.   S [facility] RN
Patient admitted to [facility]. Placed on appropriate monitors. Dressing checked and bruit and thrill. present. Assisted with liquids and nutrition. Call light at bedside. Family at bedside.
machine if you are to spend the night in the hospital.     PARKING INSTRUCTIONS:   [x] Arrival Time:__1030__________  · [x] Parking lot '\"I\"  is located on [facility] (the corner of [facility] and [facility]). To enter, press the button and the gate will lift. A free token will be provided to exit the lot. One car per patient is allowed to park in this lot. All other cars are to park on [address] [place] [address] Street either in the parking garage or the handicap lot. [] To reach the [facility] lobby from [address] [place] [address], upon entering the hospital, take elevator B to the 3rd floor. EDUCATION INSTRUCTIONS:      [] Knee or hip replacement booklet & exercise pamphlets given. [] eGlyu 77 placed in chart. [] Pre-admission Testing educational folder given  [] Incentive Spirometry,coughing & deep breathing exercises reviewed. []Medication information sheet(s)   []Fluoroscopy-Xray used in surgery reviewed with patient. Educational pamphlet placed in chart. [x]Pain: Post-op pain is normal and to be expected. You will be asked to rate your pain from 0-10(a zero is not acceptable-education is needed). Your post-op pain goal is:5[x] Ask your nurse for your pain medication. [] Joint camp offered. [] Joint replacement booklets given. [] Other:___________________________    MEDICATION INSTRUCTIONS:   [x]Bring a complete list of your medications, please write the last time you took the medicine, give this list to the nurse. [x] Take the following medications the morning of surgery with 1-2 ounces of water: see list  [] Stop herbal supplements and vitamins 5 days before your surgery. [] DO NOT take any diabetic medicine the morning of surgery. Follow instructions for insulin the day before surgery. [] If you are diabetic and your blood sugar is low or you feel symptomatic, you may drink 1-2 ounces of apple juice or take a glucose tablet.   The morning of your procedure, you may

## 2019-08-23 ENCOUNTER — TELEPHONE (OUTPATIENT)
Dept: INTERNAL MEDICINE CLINIC | Age: 49
End: 2019-08-23

## 2019-09-03 RX ORDER — ATORVASTATIN CALCIUM 10 MG/1
TABLET, FILM COATED ORAL
Qty: 30 TABLET | Refills: 3 | Status: SHIPPED | OUTPATIENT
Start: 2019-09-03

## 2019-09-04 ENCOUNTER — OFFICE VISIT (OUTPATIENT)
Dept: SURGERY | Age: 49
End: 2019-09-04
Payer: MEDICARE

## 2019-09-04 VITALS
HEART RATE: 56 BPM | DIASTOLIC BLOOD PRESSURE: 63 MMHG | SYSTOLIC BLOOD PRESSURE: 111 MMHG | TEMPERATURE: 98.4 F | OXYGEN SATURATION: 96 % | BODY MASS INDEX: 39.17 KG/M2 | RESPIRATION RATE: 18 BRPM | HEIGHT: 75 IN | WEIGHT: 315 LBS

## 2019-09-04 DIAGNOSIS — D63.1 ANEMIA DUE TO STAGE 4 CHRONIC KIDNEY DISEASE (HCC): Primary | ICD-10-CM

## 2019-09-04 DIAGNOSIS — N18.4 ANEMIA DUE TO STAGE 4 CHRONIC KIDNEY DISEASE (HCC): Primary | ICD-10-CM

## 2019-09-04 DIAGNOSIS — K21.9 GASTROESOPHAGEAL REFLUX DISEASE WITHOUT ESOPHAGITIS: ICD-10-CM

## 2019-09-04 PROCEDURE — 3017F COLORECTAL CA SCREEN DOC REV: CPT | Performed by: SURGERY

## 2019-09-04 PROCEDURE — 99212 OFFICE O/P EST SF 10 MIN: CPT | Performed by: SURGERY

## 2019-09-04 PROCEDURE — G8427 DOCREV CUR MEDS BY ELIG CLIN: HCPCS | Performed by: SURGERY

## 2019-09-04 PROCEDURE — 1036F TOBACCO NON-USER: CPT | Performed by: SURGERY

## 2019-09-04 PROCEDURE — G8417 CALC BMI ABV UP PARAM F/U: HCPCS | Performed by: SURGERY

## 2019-09-04 NOTE — PROGRESS NOTES
NAD  HEENT: NCAT, PERRLA, EOMI  Lungs: Clear, equal rise bilateral  Heart: Reg  Abdomen: soft, nondistended, nontender  Skin: No lesions  Psych: No distress, conversive, no hallucinations  : No ulcers or lesions  Rectal: No bleeding    Review of Systems -  General ROS: negative for - chills, fatigue or malaise  ENT ROS: negative for - hearing change, nasal congestion or nasal discharge  Allergy and Immunology ROS: negative for - hives, itchy/watery eyes or nasal congestion  Hematological and Lymphatic ROS: negative for - blood clots, blood transfusions, bruising or fatigue  Endocrine ROS: negative for - malaise/lethargy, mood swings, palpitations or polydipsia/polyuria  Respiratory ROS: negative for - sputum changes, stridor, tachypnea or wheezing  Cardiovascular ROS: negative for - irregular heartbeat, loss of consciousness, murmur or orthopnea  Gastrointestinal ROS: negative for - constipation, diarrhea, gas/bloating, heartburn or hematemesis  Genito-Urinary ROS: negative for -  genital discharge, genital ulcers or hematuria  Musculoskeletal ROS: negative for - gait disturbance, muscle pain or muscular weakness    Time spent reviewing past medical, surgical, social and family history, vitals, nursing assessment and images.     Colonoscopy: diverticulosis    EGD: normal        Assessment/Plan:  Enzo Higuera is a 47 y.o. male s/p colonoscopy with findings of diverticlosis    Resume normal colon screening per NCCN guidelines  Next colonoscopy 10 yrs  High fiber diet- 40g daily  Advise 60oz water intake daily  Follow up as needed    Physician Signature: Electronically signed by Dr. Omar Mabry  [phone] (p)  9/4/2019  11:43 AM

## 2021-03-09 NOTE — CARE COORDINATION
Ambulatory Care Coordination Note  3/25/2019  CM Risk Score: 10  Guadalupe Mortality Risk Score:      ACC: Sterling Blanton, RN    Summary Note:   Patient called for follow up to need for appointment  - wife answered the phone  - states that patient is doing okay. No signs of bleeding  - thanked him for getting blood work done and will see who will review tomorrow if PCP is out of town  - also let him know that no openings on 4/9 date first given to patient but that 4/16 has openings. Wife talked with  and 1pm will work for that day   - let both patient and wife know that if any questions or concerns to call. Wife voiced understanding and will see him at appointment      Care Coordination Interventions    Program Enrollment:  Complex Care  Referral from Primary Care Provider:  Yes  Suggested Interventions and Community Resources  Medication Assistance Program:  In Process (Comment: referral to PAP )         Goals Addressed                 This Visit's Progress     Patient Stated (pt-stated)   No change     To be more regular with PT/INR blood work    Barriers: lack of motivation and stress  Plan for overcoming my barriers: work with [facility]  Confidence: 5/10  Anticipated Goal Completion Date: 5/1/2019            Prior to Admission medications    Medication Sig Start Date End Date Taking? Authorizing Provider   furosemide (LASIX) 40 MG tablet TAKE 1 TABLET BY MOUTH THREE TIMES DAILY 3/6/19   Jose Push, DO   metoprolol succinate (TOPROL XL) 100 MG extended release tablet Take 1 tablet by mouth 2 times daily 3/6/19   Jose Push, DO   allopurinol (ZYLOPRIM) 100 MG tablet Take 1 tablet by mouth daily 3/6/19   Jose Push, DO   warfarin (COUMADIN) 10 MG tablet Take 1 tablet by mouth daily Take 10mg daily for 5 days and then resume 8mg daily.  Have INR checked after 3 doses of 10 mg. 3/5/19   Jose Push, DO   warfarin (COUMADIN) 4 MG tablet Take 2 tablets by mouth daily for 14 days Have
Spoke with Dr Jake Logan and she sad that INR in goal range and that he can have PT/INR done per order of PCP.
Other Procedure: Botox
Procedure To Be Performed At Next Visit: Filler: Tamicavederm
Introduction Text (Please End With A Colon): The following procedure was deferred:
Detail Level: Zone

## (undated) DEVICE — SYRINGE MED 10ML LUERLOCK TIP W/O SFTY DISP

## (undated) DEVICE — 18 GA N.G. KIT, 10 PACK: Brand: SITE-RITE

## (undated) DEVICE — PENCIL,CAUTERY,ROCKER,PTFE,15'CORD: Brand: MEDLINE INDUSTRIES, INC.

## (undated) DEVICE — BLOCK BITE 60FR CAREGUARD

## (undated) DEVICE — BLADE CLIPPER GEN PURP NS

## (undated) DEVICE — SURGICAL PROCEDURE PACK VASC MAJ CUST

## (undated) DEVICE — GOWN SURG PERF LG STD STRL AERO BLU

## (undated) DEVICE — CONTAINER SPEC COLL 960ML POLYPR TRIANG GRAD INTAKE/OUTPUT

## (undated) DEVICE — CANNULA INJ L2.5IN BLNT TIP 3MM CLR BODY W/ 1 W VLV DLP

## (undated) DEVICE — SPONGE GZ 4IN 4IN 4 PLY N WVN AVANT

## (undated) DEVICE — LOOP VES W13MM THK09MM MINI RED SIL FLD REPELLENT

## (undated) DEVICE — INTENDED FOR TISSUE SEPARATION, AND OTHER PROCEDURES THAT REQUIRE A SHARP SURGICAL BLADE TO PUNCTURE OR CUT.: Brand: BARD-PARKER ® STAINLESS STEEL BLADES

## (undated) DEVICE — LOOP VES W25MM THK1MM MAXI RED SIL FLD REPELLENT 100 PER

## (undated) DEVICE — 1.5L THIN WALL CAN: Brand: CRD

## (undated) DEVICE — 34" SINGLE PATIENT USE HOVERMATT BREATHABLE: Brand: SINGLE PATIENT USE HOVERMATT

## (undated) DEVICE — MAGNETIC INSTR DRAPE 20X16: Brand: MEDLINE INDUSTRIES, INC.

## (undated) DEVICE — CATHETER ETER IV 20GA L1IN POLYUR STR RADPQ INTROCAN SFTY

## (undated) DEVICE — PATIENT RETURN ELECTRODE, SINGLE-USE, CONTACT QUALITY MONITORING, ADULT, WITH 9FT CORD, FOR PATIENTS WEIGING OVER 33LBS. (15KG): Brand: MEGADYNE

## (undated) DEVICE — GLOVE ORANGE PI 7 1/2   MSG9075

## (undated) DEVICE — DRAPE SURGICAL HAND PROX AURORA

## (undated) DEVICE — SCANLAN® VASCU-STATT® SINGLE-USE BULLDOG CLAMP - MIDI ANGLED 45° (WHITE), CLAMPING PRESSURE 25-30 G (2/STERILE PKG): Brand: SCANLAN® VASCU-STATT® SINGLE-USE BULLDOG CLAMP

## (undated) DEVICE — SKIN AFFIX SURG ADHESIVE 72/CS 0.55ML: Brand: MEDLINE

## (undated) DEVICE — MEDI-VAC YANKAUER SUCTION HANDLE W/BULBOUS TIP: Brand: CARDINAL HEALTH

## (undated) DEVICE — GOWN,SIRUS,FABRNF,XL,20/CS: Brand: MEDLINE

## (undated) DEVICE — SOLUTION IV IRRIG POUR BRL 0.9% SODIUM CHL 2F7124

## (undated) DEVICE — TOWEL,OR,DSP,ST,BLUE,STD,6/PK,12PK/CS: Brand: MEDLINE

## (undated) DEVICE — GAUZE,SPONGE,4"X4",16PLY,XRAY,STRL,LF: Brand: MEDLINE

## (undated) DEVICE — Z DISCONTINUED PER MEDLINE USE 2425483 TAPE UMB L30IN DIA1/8IN WHT COT NONABSORBABLE W/O NDL FOR

## (undated) DEVICE — STANDARD HYPODERMIC NEEDLE,POLYPROPYLENE HUB: Brand: MONOJECT

## (undated) DEVICE — LABEL MED 4 IN SURG PANEL W/ PEN STRL

## (undated) DEVICE — CLAMP INSERT: Brand: STEALTH® CLAMP INSERT

## (undated) DEVICE — GLOVE SURG SZ 75 STD WHT LTX SYN POLYMER BEAD REINF ANTI RL

## (undated) DEVICE — Device

## (undated) DEVICE — SET SURG INSTR MINI VASC

## (undated) DEVICE — KENDALL 450 SERIES MONITORING FOAM ELECTRODE - RECTANGULAR SHAPE ( 3/PK): Brand: KENDALL

## (undated) DEVICE — SOLUTION IV IRRIG 500ML 0.9% SODIUM CHL 2F7123

## (undated) DEVICE — SET INSTR ART 1

## (undated) DEVICE — GLOVE SURG L12IN SZ 65FNGR THK94MIL TRNSLUC YEL LTX

## (undated) DEVICE — DILATOR ART

## (undated) DEVICE — DOUBLE BASIN SET: Brand: MEDLINE INDUSTRIES, INC.

## (undated) DEVICE — Device: Brand: DEFENDO VALVE AND CONNECTOR KIT

## (undated) DEVICE — TOTAL TRAY, 16FR 10ML SIL FOLEY, URN: Brand: MEDLINE

## (undated) DEVICE — GOWN,SIRUS,FABRNF,L,20/CS: Brand: MEDLINE

## (undated) DEVICE — CATHETER ETER URETH 24FR L16IN RED RUB INTMIT ROB MOD BARDX

## (undated) DEVICE — 3M™ IOBAN™ 2 ANTIMICROBIAL INCISE DRAPE 6650EZ: Brand: IOBAN™ 2

## (undated) DEVICE — MEDI-VAC NON-CONDUCTIVE SUCTION TUBING: Brand: CARDINAL HEALTH

## (undated) DEVICE — LUBRICANT SURG JELLY ST BACTER TUBE 4.25OZ

## (undated) DEVICE — GLOVE SURG SZ 8 L12IN FNGR THK94MIL TRNSLUC YEL LTX HYDRGEL

## (undated) DEVICE — GRADUATE

## (undated) DEVICE — SOLUTION IV IRRIG WATER 1000ML POUR BRL 2F7114

## (undated) DEVICE — MASK,FACE,MAXFLUIDPROTECT,SHIELD/ERLPS: Brand: MEDLINE

## (undated) DEVICE — TUBING, SUCTION, 3/16" X 12', STRAIGHT: Brand: MEDLINE

## (undated) DEVICE — PACK,UNIV, II AURORA: Brand: MEDLINE

## (undated) DEVICE — GOWN ISOLATN REG YEL M WT MULTIPLY SIDETIE LEV 2

## (undated) DEVICE — KIT BEDSIDE REVITAL OX 500ML

## (undated) DEVICE — CATHETER ETER IV 18GA L125IN POLYUR STR RADPQ INTROCAN SFTY

## (undated) DEVICE — Z INACTIVE USE 2641837 CLIP LIG M BLU TI HRT SHP WIRE HORZ 600 PER BX

## (undated) DEVICE — GLOVE ORANGE PI 8   MSG9080

## (undated) DEVICE — SET SURG INSTR ART III

## (undated) DEVICE — 3M(TM) MEDIPORE(TM) +PAD SOFT CLOTH ADHESIVE WOUND DRESSING 3569: Brand: 3M™ MEDIPORE™

## (undated) DEVICE — CHLORAPREP 26ML ORANGE

## (undated) DEVICE — DRAPE,REIN 53X77,STERILE: Brand: MEDLINE